# Patient Record
Sex: FEMALE | Race: WHITE | NOT HISPANIC OR LATINO | Employment: STUDENT | URBAN - METROPOLITAN AREA
[De-identification: names, ages, dates, MRNs, and addresses within clinical notes are randomized per-mention and may not be internally consistent; named-entity substitution may affect disease eponyms.]

---

## 2023-09-06 ENCOUNTER — LAB (OUTPATIENT)
Dept: LAB | Facility: LAB | Age: 23
End: 2023-09-06
Payer: COMMERCIAL

## 2023-09-25 PROBLEM — H69.93 EUSTACHIAN TUBE DYSFUNCTION, BILATERAL: Status: ACTIVE | Noted: 2023-09-25

## 2023-09-25 PROBLEM — F41.1 GENERALIZED ANXIETY DISORDER WITH PANIC ATTACKS: Chronic | Status: ACTIVE | Noted: 2018-10-26

## 2023-09-25 PROBLEM — N94.0 MITTELSCHMERZ: Status: ACTIVE | Noted: 2023-09-25

## 2023-09-25 PROBLEM — F41.0 GENERALIZED ANXIETY DISORDER WITH PANIC ATTACKS: Chronic | Status: ACTIVE | Noted: 2018-10-26

## 2023-09-25 RX ORDER — ESCITALOPRAM OXALATE 10 MG/1
10 TABLET ORAL DAILY
COMMUNITY
Start: 2019-02-26 | End: 2023-10-03

## 2023-09-25 RX ORDER — ADAPALENE AND BENZOYL PEROXIDE GEL, 0.1%/2.5% 1; 25 MG/G; MG/G
GEL TOPICAL
COMMUNITY
Start: 2017-01-16 | End: 2023-10-03

## 2023-09-25 RX ORDER — ALBUTEROL SULFATE 90 UG/1
2 AEROSOL, METERED RESPIRATORY (INHALATION) EVERY 4 HOURS PRN
COMMUNITY
Start: 2017-01-16 | End: 2023-10-03

## 2023-09-25 RX ORDER — FLUTICASONE PROPIONATE 50 MCG
2 SPRAY, SUSPENSION (ML) NASAL DAILY
COMMUNITY
Start: 2021-08-11 | End: 2023-10-03

## 2023-09-25 RX ORDER — DESOGESTREL AND ETHINYL ESTRADIOL 21-5 (28)
KIT ORAL DAILY
COMMUNITY
Start: 2019-05-23 | End: 2023-10-03

## 2023-09-25 RX ORDER — IBUPROFEN 200 MG
400 TABLET ORAL EVERY 6 HOURS PRN
COMMUNITY
Start: 2013-03-20 | End: 2023-12-14 | Stop reason: WASHOUT

## 2023-09-25 RX ORDER — ONDANSETRON 4 MG/1
4 TABLET, ORALLY DISINTEGRATING ORAL EVERY 6 HOURS PRN
COMMUNITY
Start: 2021-08-10 | End: 2023-10-03

## 2023-09-25 RX ORDER — ESCITALOPRAM OXALATE 20 MG/1
20 TABLET ORAL DAILY
COMMUNITY
End: 2023-10-03

## 2023-10-02 ENCOUNTER — APPOINTMENT (OUTPATIENT)
Dept: NEUROLOGY | Facility: CLINIC | Age: 23
End: 2023-10-02
Payer: COMMERCIAL

## 2023-10-03 ENCOUNTER — OFFICE VISIT (OUTPATIENT)
Dept: OBSTETRICS AND GYNECOLOGY | Facility: CLINIC | Age: 23
End: 2023-10-03
Payer: COMMERCIAL

## 2023-10-03 VITALS
WEIGHT: 146.8 LBS | SYSTOLIC BLOOD PRESSURE: 106 MMHG | HEIGHT: 69 IN | DIASTOLIC BLOOD PRESSURE: 64 MMHG | BODY MASS INDEX: 21.74 KG/M2

## 2023-10-03 DIAGNOSIS — Z30.09 COUNSELING FOR BIRTH CONTROL, ORAL CONTRACEPTIVES: ICD-10-CM

## 2023-10-03 DIAGNOSIS — Z01.419 ENCOUNTER FOR GYNECOLOGICAL EXAMINATION WITHOUT ABNORMAL FINDING: Primary | ICD-10-CM

## 2023-10-03 PROCEDURE — 1036F TOBACCO NON-USER: CPT

## 2023-10-03 PROCEDURE — 99395 PREV VISIT EST AGE 18-39: CPT

## 2023-10-03 RX ORDER — NORETHINDRONE ACETATE AND ETHINYL ESTRADIOL 1MG-20(21)
1 KIT ORAL DAILY
Qty: 84 TABLET | Refills: 3 | Status: SHIPPED | OUTPATIENT
Start: 2023-10-03 | End: 2023-12-13

## 2023-10-03 ASSESSMENT — ENCOUNTER SYMPTOMS
FEVER: 0
HEADACHES: 0
COLOR CHANGE: 0
LIGHT-HEADEDNESS: 0
UNEXPECTED WEIGHT CHANGE: 0
VOMITING: 0
DYSURIA: 0
DIZZINESS: 0
SHORTNESS OF BREATH: 0
ABDOMINAL PAIN: 0
NAUSEA: 0
FATIGUE: 0

## 2023-10-03 NOTE — PROGRESS NOTES
"Subjective   Kenny Franklin is a 23 y.o. female who is here for a routine GYN exam. Menses are overall fairly regular, sometimes can be a week or so late. History of PCOS. No intermenstrual bleeding, spotting, or discharge. Patient getting  this weekend, has previously been on OCPs and had some associated nausea and bloating with it, but would like to start back on for contraception. No CI to OCPs. Same long term partner, declines STI testing today.    Current contraception: none  History of abnormal Pap smear: no  Family history of uterine or ovarian cancer: yes - maternal grandmother  Regular self breast exam: yes  History of abnormal mammogram: no  Family history of breast cancer: no  Menstrual History:  OB History          0    Para   0    Term   0       0    AB   0    Living   0         SAB   0    IAB   0    Ectopic   0    Multiple   0    Live Births   0                Menarche age: 12  Patient's last menstrual period was 2023.         Review of Systems   Constitutional:  Negative for fatigue, fever and unexpected weight change.   Respiratory:  Negative for shortness of breath.    Gastrointestinal:  Negative for abdominal pain, nausea and vomiting.   Genitourinary:  Negative for dysuria, menstrual problem, pelvic pain and vaginal discharge.   Skin:  Negative for color change and rash.   Neurological:  Negative for dizziness, light-headedness and headaches.       Objective   /64   Ht 1.753 m (5' 9\")   Wt 66.6 kg (146 lb 12.8 oz)   LMP 2023   BMI 21.68 kg/m²     Physical Exam   General:   alert and oriented, in no acute distress, appears stated age, and cooperative   Breasts: No masses, no tenderness, no nipple abnormality or discharge, no skin discoloration or texture changes, no axillary lymphadenopathy   Lungs: No respiratory distress   Abdomen: soft, non-tender, without masses or organomegaly   Vulva: normal   Vagina: normal mucosa   Cervix: Normal, non-tender   Uterus: " non-tender, normal shape and consistency   Adnexa: normal adnexa     Assessment/Plan   -Discussed starting back on OCP and r/b; will trial 20 mcg pill to help prevent nausea/bloating and improve menstrual cramps. Discussed how to start pill pack and memory cues. Patient to return in 3 months for follow up med check visit.  -UTD on pap smear, due with next annual or after 8/2024.  -Declines STI screening today.

## 2023-10-19 ENCOUNTER — OFFICE VISIT (OUTPATIENT)
Dept: NEUROLOGY | Facility: CLINIC | Age: 23
End: 2023-10-19
Payer: COMMERCIAL

## 2023-10-19 VITALS
HEART RATE: 95 BPM | TEMPERATURE: 97.8 F | WEIGHT: 151 LBS | DIASTOLIC BLOOD PRESSURE: 89 MMHG | SYSTOLIC BLOOD PRESSURE: 139 MMHG | BODY MASS INDEX: 22.36 KG/M2 | RESPIRATION RATE: 18 BRPM | HEIGHT: 69 IN

## 2023-10-19 DIAGNOSIS — R42 VERTIGO: Primary | ICD-10-CM

## 2023-10-19 DIAGNOSIS — G44.219 EPISODIC TENSION-TYPE HEADACHE, NOT INTRACTABLE: ICD-10-CM

## 2023-10-19 PROCEDURE — 1036F TOBACCO NON-USER: CPT | Performed by: STUDENT IN AN ORGANIZED HEALTH CARE EDUCATION/TRAINING PROGRAM

## 2023-10-19 PROCEDURE — 99204 OFFICE O/P NEW MOD 45 MIN: CPT | Performed by: STUDENT IN AN ORGANIZED HEALTH CARE EDUCATION/TRAINING PROGRAM

## 2023-10-19 ASSESSMENT — ENCOUNTER SYMPTOMS
OCCASIONAL FEELINGS OF UNSTEADINESS: 0
LOSS OF SENSATION IN FEET: 0
DEPRESSION: 0

## 2023-10-19 NOTE — PROGRESS NOTES
"Subjective     Kenny Franklin is a 23 y.o. year old female who presents for evaluation of headaches and dizziness.     Her headaches started in May. She initially saw a Neurologist at [facility]. She was diagnosed with tension headaches. She had an MRI brain without contrast 8/1/2023.     She has episodes of lightheadedness and dizziness. When she is lying down, she will suddenly feel like this \"rush\" like she is falling and it makes her startle. She will feel woozy during these episodes as if she is \"car sick.\" This lasts for about an hour. The symptoms are rapid in onset and will gradually resolve. She will also experience room spinning dizziness when she lies down flat. She has ear fullness in both ears but no hearing loss.     She has occasional throbbing pain \"like her head is pressured and going to pop.\" The headaches are sharp and sudden in onset. The sharp pains last a few minutes. She also has pain periauricular and bilateral frontal. She has occasional nausea but this is associated with her dizziness. The pain does not wake her up at night but if she wakes up to go to the bathroom she feels dizzy. The dull pains can last a few hours.     She has tried advil for headaches but her headaches are not usually severe enough or long enough to take medications.     She had no associated URI prior to the onset of these symptoms. She recalls bilateral ear pain over the summer in July, went to an urgent care. She had an otologic exam and was told her \"ears were red and bulging.\" She was treated with antibiotics with some improvement while on the antibiotics but then her symptoms returned.     She has lifelong motion sickness, cannot read in the car, has to sit in the front seat.     Every times she swallows she can hear a cracking sensation around her ears.     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 However, her ears are still bothering her. She previously saw an ENT and " was told she had eustachian tube dysfunction. She tried Flonase but felt that her symptoms were worse.   She has no history of headaches as a teenager and college student.  She hs a family history of intense headaches in her mother. Her mother and older brother have motion sickness as well.    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 She was recently .     Patient Active Problem List   Diagnosis    Eustachian tube dysfunction, bilateral    Generalized anxiety disorder with panic attacks    Idiopathic scoliosis and kyphoscoliosis    Mittelschmerz    Ovarian cyst    Ovarian torsion    Encounter for gynecological examination without abnormal finding    Counseling for birth control, oral contraceptives    Vertigo     Past Medical History:   Diagnosis Date    PCOS (polycystic ovarian syndrome)      Past Surgical History:   Procedure Laterality Date    OVARY SURGERY Right     RT ovary torsion     Social History     Tobacco Use    Smoking status: Never    Smokeless tobacco: Never   Substance Use Topics    Alcohol use: Yes     Comment: 3 every other week     family history includes Hyperlipidemia in her father; Lung cancer in her paternal grandfather; No Known Problems in her brother and brother; Ovarian cancer in her maternal grandmother; Prostate cancer in her paternal grandfather; Stomach cancer in her maternal grandfather; Uterine cancer in her maternal grandmother.    Current Outpatient Medications:     ibuprofen 200 mg tablet, Take 2 tablets (400 mg) by mouth every 6 hours if needed for fever (temp greater than 38.0 C), moderate pain (4 - 6) or mild pain (1 - 3)., Disp: , Rfl:     norethindrone-e.estradioL-iron (Junel FE 1/20) 1 mg-20 mcg (21)/75 mg (7) tablet, Take 1 tablet by mouth once daily. (Patient taking differently: Take 1 tablet by mouth once daily. Not started yet), Disp: 84 tablet, Rfl: 3  No Known Allergies    Objective   Neurological Exam  Mental Status  Awake, alert and oriented to person, place  and time. Speech is normal.    Cranial Nerves  CN II: Visual fields full to confrontation.  CN III, IV, VI: Extraocular movements intact bilaterally.  CN V: Facial sensation is normal.  CN VII: Full and symmetric facial movement.  CN VIII: Hearing is normal.  CN IX, X: Palate elevates symmetrically  CN XI: Shoulder shrug strength is normal.  CN XII: Tongue midline without atrophy or fasciculations.    Motor   Strength is 5/5 throughout all four extremities.    Sensory  Light touch is normal in upper and lower extremities.     Coordination  Right: Finger-to-nose normal.Left: Finger-to-nose normal.    Assessment/Plan   Diagnoses and all orders for this visit:  Vertigo  Episodic tension-type headache, not intractable    Headaches: probable migraine headache trait with episodic tension headaches. Discussed lifestyle modifications and trigger factor avoidance  Vertigo: horizontal nystagmus on R gaze on exam, persistent dizziness with leaning head backwards or lying down at night that has persisted for several months. No hearing loss or ear fullness. Will check MRI IAC with and without contrast, referral to vestibular PT    Follow-up in 6 months

## 2023-11-02 ENCOUNTER — ANCILLARY PROCEDURE (OUTPATIENT)
Dept: RADIOLOGY | Facility: CLINIC | Age: 23
End: 2023-11-02
Payer: COMMERCIAL

## 2023-11-02 DIAGNOSIS — G44.219 EPISODIC TENSION-TYPE HEADACHE, NOT INTRACTABLE: ICD-10-CM

## 2023-11-02 DIAGNOSIS — R42 VERTIGO: ICD-10-CM

## 2023-11-02 PROCEDURE — A9575 INJ GADOTERATE MEGLUMI 0.1ML: HCPCS | Performed by: STUDENT IN AN ORGANIZED HEALTH CARE EDUCATION/TRAINING PROGRAM

## 2023-11-02 PROCEDURE — 2550000001 HC RX 255 CONTRASTS: Performed by: STUDENT IN AN ORGANIZED HEALTH CARE EDUCATION/TRAINING PROGRAM

## 2023-11-02 PROCEDURE — 70553 MRI BRAIN STEM W/O & W/DYE: CPT

## 2023-11-02 PROCEDURE — 70553 MRI BRAIN STEM W/O & W/DYE: CPT | Performed by: RADIOLOGY

## 2023-11-02 RX ORDER — GADOTERATE MEGLUMINE 376.9 MG/ML
14 INJECTION INTRAVENOUS
Status: COMPLETED | OUTPATIENT
Start: 2023-11-02 | End: 2023-11-02

## 2023-11-02 RX ADMIN — GADOTERATE MEGLUMINE 14 ML: 376.9 INJECTION INTRAVENOUS at 11:33

## 2023-11-21 ENCOUNTER — LAB REQUISITION (OUTPATIENT)
Dept: LAB | Facility: HOSPITAL | Age: 23
End: 2023-11-21
Payer: COMMERCIAL

## 2023-11-21 LAB — SARS-COV-2 RNA RESP QL NAA+PROBE: DETECTED

## 2023-11-21 PROCEDURE — 87635 SARS-COV-2 COVID-19 AMP PRB: CPT

## 2023-12-08 ENCOUNTER — HOSPITAL ENCOUNTER (OUTPATIENT)
Dept: CARDIOLOGY | Facility: HOSPITAL | Age: 23
Discharge: HOME | End: 2023-12-08
Payer: COMMERCIAL

## 2023-12-08 ENCOUNTER — OFFICE VISIT (OUTPATIENT)
Dept: CARDIOLOGY | Facility: CLINIC | Age: 23
End: 2023-12-08
Payer: COMMERCIAL

## 2023-12-08 ENCOUNTER — APPOINTMENT (OUTPATIENT)
Dept: CARDIOLOGY | Facility: CLINIC | Age: 23
End: 2023-12-08
Payer: COMMERCIAL

## 2023-12-08 VITALS
HEIGHT: 69 IN | BODY MASS INDEX: 22.66 KG/M2 | DIASTOLIC BLOOD PRESSURE: 77 MMHG | HEART RATE: 95 BPM | SYSTOLIC BLOOD PRESSURE: 123 MMHG | WEIGHT: 153 LBS | OXYGEN SATURATION: 97 %

## 2023-12-08 DIAGNOSIS — R07.82 INTERCOSTAL PAIN: ICD-10-CM

## 2023-12-08 DIAGNOSIS — R07.82 INTERCOSTAL PAIN: Primary | ICD-10-CM

## 2023-12-08 DIAGNOSIS — R42 VERTIGO: ICD-10-CM

## 2023-12-08 DIAGNOSIS — R00.2 PALPITATIONS: ICD-10-CM

## 2023-12-08 PROCEDURE — 93010 ELECTROCARDIOGRAM REPORT: CPT | Performed by: INTERNAL MEDICINE

## 2023-12-08 PROCEDURE — 99213 OFFICE O/P EST LOW 20 MIN: CPT | Performed by: NURSE PRACTITIONER

## 2023-12-08 PROCEDURE — 1036F TOBACCO NON-USER: CPT | Performed by: NURSE PRACTITIONER

## 2023-12-08 PROCEDURE — 93005 ELECTROCARDIOGRAM TRACING: CPT

## 2023-12-08 NOTE — PROGRESS NOTES
Kenny Franklin is a 23 y.o. female     History Of Present Illness   Miss Franklin is a 23 year old with a PMH of depression, anxiety, ovarian torsion, PCOS, migraines and vertigo, here for complaints of chest pain. She reports significant palpitations with position changes that cause her to have chest pains.  The chest tightness is nonradiating and nothing makes the pain worse and nothing makes it better. She describes the pain as burning and achey.  Her apple watch indicates her HR is 80.   The pain lasts several seconds and is more predominant in the evening and with position changes.  She was seen by cardiology in the past and underwent a completed cardiac work up with no underlying cause found.       Social HX  Social History     Tobacco Use    Smoking status: Never    Smokeless tobacco: Never   Vaping Use    Vaping Use: Never used   Substance Use Topics    Alcohol use: Yes     Comment: 3 every other week    Drug use: Never          Family HX  Family History   Problem Relation Name Age of Onset    Hyperlipidemia Father      No Known Problems Brother      No Known Problems Brother      Ovarian cancer Maternal Grandmother      Uterine cancer Maternal Grandmother      Stomach cancer Maternal Grandfather      Lung cancer Paternal Grandfather      Prostate cancer Paternal Grandfather        Anxiety disorder Mother  Hyperlipidemia Father  Anxiety disorder Brother  Ovarian cancer Maternal Grandmother  other (stomach cancer) Maternal Grandfather  Lung Cancer Paternal Grandfather      Review Of Systems   Review of Systems   Constitutional: Negative.  Negative for activity change, appetite change, chills and diaphoresis.   HENT: Negative.     Eyes: Negative.    Respiratory:  Positive for chest tightness. Negative for shortness of breath.    Cardiovascular:  Positive for chest pain and palpitations.   Endocrine: Negative.    Genitourinary: Negative.    Skin: Negative.    Allergic/Immunologic: Negative.    Neurological:  Negative  for dizziness, tremors, syncope, speech difficulty, weakness, light-headedness and numbness.   Hematological: Negative.    Psychiatric/Behavioral:  Positive for dysphoric mood. Negative for agitation. The patient is nervous/anxious.           Allergies  No Known Allergies       Vitals  BP is 123/77    Physical Exam  Physical Exam  Constitutional:       Appearance: Normal appearance. She is normal weight.   HENT:      Head: Normocephalic.      Nose: Nose normal.      Mouth/Throat:      Mouth: Mucous membranes are moist.      Pharynx: Oropharynx is clear.   Cardiovascular:      Rate and Rhythm: Normal rate and regular rhythm.      Pulses: Normal pulses.      Heart sounds: Normal heart sounds. No murmur heard.     No friction rub. No gallop.   Pulmonary:      Effort: Pulmonary effort is normal. No respiratory distress.      Breath sounds: Normal breath sounds. No wheezing.   Abdominal:      General: Abdomen is flat.      Palpations: Abdomen is soft.   Musculoskeletal:         General: No tenderness or signs of injury. Normal range of motion.      Cervical back: Normal range of motion and neck supple.      Right lower leg: Edema present.      Left lower leg: No edema.   Skin:     General: Skin is warm and dry.   Neurological:      General: No focal deficit present.      Mental Status: She is alert and oriented to person, place, and time. Mental status is at baseline.   Psychiatric:         Mood and Affect: Mood normal.         Behavior: Behavior normal.         Thought Content: Thought content normal.         Judgment: Judgment normal.            Current/Home Meds    Current Outpatient Medications:     ibuprofen 200 mg tablet, Take 2 tablets (400 mg) by mouth every 6 hours if needed for fever (temp greater than 38.0 C), moderate pain (4 - 6) or mild pain (1 - 3)., Disp: , Rfl:     norethindrone-e.estradioL-iron (Junel FE 1/20) 1 mg-20 mcg (21)/75 mg (7) tablet, Take 1 tablet by mouth once daily. (Patient taking  differently: Take 1 tablet by mouth once daily. Not started yet), Disp: 84 tablet, Rfl: 3       Labs           EKG Findings  EKG: TODAY shows normal sinus rhythm with an abnormal QRS-T angle conisder T wave abnormality         Cardiac Service Results:  4/6/23: ECHO: 1. Left ventricular systolic function is normal with a 60-65% estimated ejection fraction.       Assessment/Plan      CHEST PAIN:  EKG today shows NSR with a t wave abnormaility  BP is 123/77  Prior echo shows a normal LV systolic function with 60-65%  Orthostatic vital signs show:    LAYING 120/80  77  SITTING 120/80  92  STANDING 122/80  104    Orthostatic vital signs indicate POTS.  Will have her under go autonomic testing and follow up after testing to review the results.  She understands a normal heart rate in between .  She is to increase her fluid intake and wear compression stockings from am to HS.  Joy may also increase her sodium intake until her follow up.

## 2023-12-11 ENCOUNTER — TELEPHONE (OUTPATIENT)
Dept: OBSTETRICS AND GYNECOLOGY | Facility: CLINIC | Age: 23
End: 2023-12-11
Payer: COMMERCIAL

## 2023-12-11 ASSESSMENT — ENCOUNTER SYMPTOMS
MYALGIAS: 0
APPETITE CHANGE: 0
HEMATOLOGIC/LYMPHATIC NEGATIVE: 1
VOMITING: 0
DIZZINESS: 0
CONSTITUTIONAL NEGATIVE: 1
DIARRHEA: 0
SHORTNESS OF BREATH: 0
ABDOMINAL PAIN: 1
NERVOUS/ANXIOUS: 1
FLATUS: 0
SPEECH DIFFICULTY: 0
CONSTIPATION: 0
HEADACHES: 0
PALPITATIONS: 1
BELCHING: 0
ACTIVITY CHANGE: 0
DYSPHORIC MOOD: 1
FREQUENCY: 0
WEAKNESS: 0
EYES NEGATIVE: 1
ENDOCRINE NEGATIVE: 1
HEMATURIA: 0
NUMBNESS: 0
ARTHRALGIAS: 0
AGITATION: 0
DIAPHORESIS: 0
CHILLS: 0
FEVER: 0
CHEST TIGHTNESS: 1
WEIGHT LOSS: 0
ALLERGIC/IMMUNOLOGIC NEGATIVE: 1
ANOREXIA: 0
LIGHT-HEADEDNESS: 0
TREMORS: 0
NAUSEA: 0
HEMATOCHEZIA: 0
DYSURIA: 0

## 2023-12-13 ENCOUNTER — LAB (OUTPATIENT)
Dept: LAB | Facility: LAB | Age: 23
End: 2023-12-13
Payer: COMMERCIAL

## 2023-12-13 ENCOUNTER — OFFICE VISIT (OUTPATIENT)
Dept: OBSTETRICS AND GYNECOLOGY | Facility: CLINIC | Age: 23
End: 2023-12-13
Payer: COMMERCIAL

## 2023-12-13 VITALS
SYSTOLIC BLOOD PRESSURE: 118 MMHG | DIASTOLIC BLOOD PRESSURE: 80 MMHG | WEIGHT: 151 LBS | BODY MASS INDEX: 22.36 KG/M2 | HEIGHT: 69 IN

## 2023-12-13 DIAGNOSIS — R07.82 INTERCOSTAL PAIN: ICD-10-CM

## 2023-12-13 DIAGNOSIS — N94.10 DYSPAREUNIA IN FEMALE: ICD-10-CM

## 2023-12-13 DIAGNOSIS — R42 VERTIGO: ICD-10-CM

## 2023-12-13 DIAGNOSIS — N76.1 SUBACUTE VAGINITIS: ICD-10-CM

## 2023-12-13 DIAGNOSIS — N92.6 IRREGULAR MENSES: Primary | ICD-10-CM

## 2023-12-13 DIAGNOSIS — N92.6 IRREGULAR MENSES: ICD-10-CM

## 2023-12-13 DIAGNOSIS — L70.9 ACNE, UNSPECIFIED ACNE TYPE: ICD-10-CM

## 2023-12-13 DIAGNOSIS — R10.2 PELVIC PAIN: ICD-10-CM

## 2023-12-13 DIAGNOSIS — R00.2 PALPITATIONS: ICD-10-CM

## 2023-12-13 LAB
ALBUMIN SERPL-MCNC: 4.9 G/DL (ref 3.5–5)
ALP BLD-CCNC: 40 U/L (ref 35–125)
ALT SERPL-CCNC: 13 U/L (ref 5–40)
ANION GAP SERPL CALC-SCNC: 17 MMOL/L
AST SERPL-CCNC: 15 U/L (ref 5–40)
BILIRUB SERPL-MCNC: 0.7 MG/DL (ref 0.1–1.2)
BUN SERPL-MCNC: 12 MG/DL (ref 8–25)
CALCIUM SERPL-MCNC: 9.9 MG/DL (ref 8.5–10.4)
CHLORIDE SERPL-SCNC: 105 MMOL/L (ref 97–107)
CHOLEST SERPL-MCNC: 129 MG/DL (ref 133–200)
CHOLEST/HDLC SERPL: 2.3 {RATIO}
CO2 SERPL-SCNC: 20 MMOL/L (ref 24–31)
CREAT SERPL-MCNC: 0.7 MG/DL (ref 0.4–1.6)
ERYTHROCYTE [DISTWIDTH] IN BLOOD BY AUTOMATED COUNT: 12.4 % (ref 11.5–14.5)
GFR SERPL CREATININE-BSD FRML MDRD: >90 ML/MIN/1.73M*2
GLUCOSE SERPL-MCNC: 84 MG/DL (ref 65–99)
HCG SERPL-ACNC: <1 MIU/ML
HCT VFR BLD AUTO: 43.1 % (ref 36–46)
HDLC SERPL-MCNC: 55 MG/DL
HGB BLD-MCNC: 14.2 G/DL (ref 12–16)
LDLC SERPL CALC-MCNC: 62 MG/DL (ref 65–130)
MCH RBC QN AUTO: 30.3 PG (ref 26–34)
MCHC RBC AUTO-ENTMCNC: 32.9 G/DL (ref 32–36)
MCV RBC AUTO: 92 FL (ref 80–100)
NRBC BLD-RTO: 0 /100 WBCS (ref 0–0)
PLATELET # BLD AUTO: 297 X10*3/UL (ref 150–450)
POTASSIUM SERPL-SCNC: 4 MMOL/L (ref 3.4–5.1)
PROT SERPL-MCNC: 7.1 G/DL (ref 5.9–7.9)
RBC # BLD AUTO: 4.69 X10*6/UL (ref 4–5.2)
SODIUM SERPL-SCNC: 142 MMOL/L (ref 133–145)
TRIGL SERPL-MCNC: 61 MG/DL (ref 40–150)
TSH SERPL DL<=0.05 MIU/L-ACNC: 1.58 MIU/L (ref 0.27–4.2)
WBC # BLD AUTO: 5.1 X10*3/UL (ref 4.4–11.3)

## 2023-12-13 PROCEDURE — 84402 ASSAY OF FREE TESTOSTERONE: CPT

## 2023-12-13 PROCEDURE — 84702 CHORIONIC GONADOTROPIN TEST: CPT

## 2023-12-13 PROCEDURE — 99213 OFFICE O/P EST LOW 20 MIN: CPT | Performed by: OBSTETRICS & GYNECOLOGY

## 2023-12-13 PROCEDURE — 85027 COMPLETE CBC AUTOMATED: CPT

## 2023-12-13 PROCEDURE — 82627 DEHYDROEPIANDROSTERONE: CPT

## 2023-12-13 PROCEDURE — 87205 SMEAR GRAM STAIN: CPT | Performed by: OBSTETRICS & GYNECOLOGY

## 2023-12-13 PROCEDURE — 80061 LIPID PANEL: CPT

## 2023-12-13 PROCEDURE — 1036F TOBACCO NON-USER: CPT | Performed by: OBSTETRICS & GYNECOLOGY

## 2023-12-13 PROCEDURE — 36415 COLL VENOUS BLD VENIPUNCTURE: CPT

## 2023-12-13 PROCEDURE — 80053 COMPREHEN METABOLIC PANEL: CPT

## 2023-12-13 PROCEDURE — 84146 ASSAY OF PROLACTIN: CPT

## 2023-12-13 PROCEDURE — 84443 ASSAY THYROID STIM HORMONE: CPT

## 2023-12-13 PROCEDURE — 83498 ASY HYDROXYPROGESTERONE 17-D: CPT

## 2023-12-13 ASSESSMENT — LIFESTYLE VARIABLES
HOW OFTEN DO YOU HAVE SIX OR MORE DRINKS ON ONE OCCASION: NEVER
SKIP TO QUESTIONS 9-10: 1
HOW MANY STANDARD DRINKS CONTAINING ALCOHOL DO YOU HAVE ON A TYPICAL DAY: 1 OR 2
HOW OFTEN DO YOU HAVE A DRINK CONTAINING ALCOHOL: MONTHLY OR LESS
AUDIT-C TOTAL SCORE: 1

## 2023-12-13 ASSESSMENT — PATIENT HEALTH QUESTIONNAIRE - PHQ9
1. LITTLE INTEREST OR PLEASURE IN DOING THINGS: NOT AT ALL
2. FEELING DOWN, DEPRESSED OR HOPELESS: NOT AT ALL
SUM OF ALL RESPONSES TO PHQ9 QUESTIONS 1 & 2: 0

## 2023-12-13 ASSESSMENT — PAIN SCALES - GENERAL: PAINLEVEL: 0-NO PAIN

## 2023-12-13 NOTE — PROGRESS NOTES
Subjective   Kenny Franklin is a 23 y.o. female who complains of vaginal discharge/odor    HPI:  Symptoms reported: foul vaginal odor, burning, and pelvic pain; pain w/sex  Onset:  2months  Course: intermittent  Progression: stayed the same    Helpful treatments: none  Unhelpful treatments tried: none    Pt also notes menses [id number], worsening acne, prev us showing multiple follicles, wants eval for pcos as well    Objective   Physical Exam:   General Appearance: alert and oriented, in no acute distress  Abdomen: soft, non-tender; bowel sounds normal; no masses, no organomegaly  Pelvic Exam: external genitalia normal, vagina normal without discharge, uterus normal size, shape, and consistency, no cervical motion tenderness, cervix normal in appearance, no adnexal masses or tenderness, and rectovaginal septum normal; +tight pelvic floor, lots of tension noted w/exam  Urine dipstick: not done.    Assessment/Plan   Diagnoses and all orders for this visit:  Irregular menses  -     CBC; Future  -     Testosterone,Free and Total; Future  -     DHEA-Sulfate; Future  -     17-Hydroxyprogesterone; Future  -     hCG, quantitative; Future  -     TSH with reflex to Free T4 if abnormal; Future  -     Prolactin; Future  -     US PELVIS TRANSABDOMINAL WITH TRANSVAGINAL; Future  Acne, unspecified acne type  -     Testosterone,Free and Total; Future  -     DHEA-Sulfate; Future  -     17-Hydroxyprogesterone; Future  Subacute vaginitis  -     Vaginitis Gram Stain For Bacterial Vaginosis + Yeast  Dyspareunia in female  -     US PELVIS TRANSABDOMINAL WITH TRANSVAGINAL; Future  -     Referral to Physical Therapy; Future  Pelvic pain  -     US PELVIS TRANSABDOMINAL WITH TRANSVAGINAL; Future  -     Referral to Physical Therapy; Future      Answers submitted by the patient for this visit:  Abdominal Pain Questionnaire (Submitted on 12/11/2023)  Chief Complaint: Abdominal pain  Chronicity: recurrent  Onset: more than 1 month ago  Onset  quality: gradual  Frequency: 2 to 4 times per day  Progression since onset: unchanged  Pain location: LLQ, RLQ  Pain - numeric: 3/10  Pain quality: cramping  Radiates to: back, pelvis  anorexia: No  arthralgias: No  belching: No  constipation: No  diarrhea: No  dysuria: No  fever: No  flatus: No  frequency: No  headaches: No  hematochezia: No  hematuria: No  melena: No  myalgias: No  nausea: No  weight loss: No  vomiting: No  Aggravated by: nothing  Relieved by: nothing

## 2023-12-14 ENCOUNTER — OFFICE VISIT (OUTPATIENT)
Dept: CARDIOLOGY | Facility: CLINIC | Age: 23
End: 2023-12-14
Payer: COMMERCIAL

## 2023-12-14 VITALS
HEART RATE: 94 BPM | DIASTOLIC BLOOD PRESSURE: 83 MMHG | HEIGHT: 69 IN | SYSTOLIC BLOOD PRESSURE: 144 MMHG | BODY MASS INDEX: 22.36 KG/M2 | WEIGHT: 151 LBS

## 2023-12-14 DIAGNOSIS — R00.2 PALPITATIONS: Primary | ICD-10-CM

## 2023-12-14 LAB
CLUE CELLS VAG LPF-#/AREA: ABNORMAL /[LPF]
DHEA-S SERPL-MCNC: 301 UG/DL (ref 65–395)
NUGENT SCORE: 2
PROLACTIN SERPL-MCNC: 15.1 UG/L (ref 3–20)
YEAST VAG WET PREP-#/AREA: PRESENT

## 2023-12-14 PROCEDURE — 99213 OFFICE O/P EST LOW 20 MIN: CPT | Performed by: INTERNAL MEDICINE

## 2023-12-14 PROCEDURE — 93000 ELECTROCARDIOGRAM COMPLETE: CPT | Performed by: INTERNAL MEDICINE

## 2023-12-14 PROCEDURE — 1036F TOBACCO NON-USER: CPT | Performed by: INTERNAL MEDICINE

## 2023-12-14 NOTE — PROGRESS NOTES
"  [facility]    Patient Name: Kenny Franklin  Patient : [id number]    Referred  for   Chief Complaint   Patient presents with    New Patient Visit        Kenny Franklin is a 23 y.o. year old female patient with    Depression/anxiety  Palpitations:  She reports significant palpitations with position changes that cause her to have chest pains. Additionally she feels lightheaded with position changes or when she is standing for prolonged periods of time. No syncope. Symptoms worsened after a recent COVID infection. She saw Lian Delong CNP. Referred patient for autonomic testing. She had a cardiac workup later this year as well including a normal echo.      Past Medical History:  She has a past medical history of PCOS (polycystic ovarian syndrome).    Past Surgical History:  She has a past surgical history that includes Ovary surgery (Right).      Social History:  She reports that she has never smoked. She has never used smokeless tobacco. She reports current alcohol use. She reports that she does not use drugs.    Family History:  Family History   Problem Relation Name Age of Onset    Hyperlipidemia Father High cholesterol     No Known Problems Brother      No Known Problems Brother      Ovarian cancer Maternal Grandmother Ovarian/uterine cancer     Uterine cancer Maternal Grandmother Ovarian/uterine cancer     Cancer Maternal Grandmother Ovarian/uterine cancer     Stomach cancer Maternal Grandfather      Lung cancer Paternal Grandfather      Prostate cancer Paternal Grandfather          Allergies:  Patient has no known allergies.    Outpatient Medications:  Current Outpatient Medications   Medication Instructions    ibuprofen 400 mg, oral, Every 6 hours PRN        ROS:  A 14 point review of systems was done and is negative other than as stated in HPI    Vitals:  Vitals:    23 1343   BP: 144/83   Pulse: 94   Weight: 68.5 kg (151 lb)   Height: 1.753 m (5' 9\")       Physical " Exam:   Physical Exam  Cardiovascular:      Rate and Rhythm: Normal rate and regular rhythm.   Pulmonary:      Effort: Pulmonary effort is normal.   Musculoskeletal:         General: No swelling.   Skin:     Findings: No rash.   Neurological:      General: No focal deficit present.      Mental Status: She is alert and oriented to person, place, and time.   Psychiatric:         Mood and Affect: Mood normal.          Intake/Output:   No intake/output data recorded.    Outpatient Medications  Current Outpatient Medications on File Prior to Visit   Medication Sig Dispense Refill    ibuprofen 200 mg tablet Take 2 tablets (400 mg) by mouth every 6 hours if needed for fever (temp greater than 38.0 C), moderate pain (4 - 6) or mild pain (1 - 3).      [DISCONTINUED] norethindrone-e.estradioL-iron (Junel FE 1/20) 1 mg-20 mcg (21)/75 mg (7) tablet Take 1 tablet by mouth once daily. (Patient not taking: Reported on 12/8/2023) 84 tablet 3     No current facility-administered medications on file prior to visit.       Labs: (past 26 weeks)  Recent Results (from the past 4368 hour(s))   Sars-CoV-2 PCR, Symptomatic    Collection Time: 11/21/23  2:10 PM   Result Value Ref Range    Coronavirus 2019, PCR Detected (A) Not Detected   Lipid panel    Collection Time: 12/13/23 11:43 AM   Result Value Ref Range    Cholesterol 129 (L) 133 - 200 mg/dL    HDL-Cholesterol 55.0 >50.0 mg/dL    Cholesterol/HDL Ratio 2.3 SEE COMMENT    LDL Calculated 62 (L) 65 - 130 mg/dL    Triglycerides 61 40 - 150 mg/dL   Comprehensive metabolic panel    Collection Time: 12/13/23 11:43 AM   Result Value Ref Range    Glucose 84 65 - 99 mg/dL    Sodium 142 133 - 145 mmol/L    Potassium 4.0 3.4 - 5.1 mmol/L    Chloride 105 97 - 107 mmol/L    Bicarbonate 20 (L) 24 - 31 mmol/L    Urea Nitrogen 12 8 - 25 mg/dL    Creatinine 0.70 0.40 - 1.60 mg/dL    eGFR >90 >60 mL/min/1.73m*2    Calcium 9.9 8.5 - 10.4 mg/dL    Albumin 4.9 3.5 - 5.0 g/dL    Alkaline Phosphatase 40 35 -  125 U/L    Total Protein 7.1 5.9 - 7.9 g/dL    AST 15 5 - 40 U/L    Bilirubin, Total 0.7 0.1 - 1.2 mg/dL    ALT 13 5 - 40 U/L    Anion Gap 17 <=19 mmol/L   CBC    Collection Time: 12/13/23 11:43 AM   Result Value Ref Range    WBC 5.1 4.4 - 11.3 x10*3/uL    nRBC 0.0 0.0 - 0.0 /100 WBCs    RBC 4.69 4.00 - 5.20 x10*6/uL    Hemoglobin 14.2 12.0 - 16.0 g/dL    Hematocrit 43.1 36.0 - 46.0 %    MCV 92 80 - 100 fL    MCH 30.3 26.0 - 34.0 pg    MCHC 32.9 32.0 - 36.0 g/dL    RDW 12.4 11.5 - 14.5 %    Platelets 297 150 - 450 x10*3/uL   DHEA-Sulfate    Collection Time: 12/13/23 11:43 AM   Result Value Ref Range    DHEA Sulfate 301 65 - 395 ug/dL   hCG, quantitative    Collection Time: 12/13/23 11:43 AM   Result Value Ref Range    HCG, Beta-Quantitative <1 SEE COMMENT BELOW mIU/mL   TSH with reflex to Free T4 if abnormal    Collection Time: 12/13/23 11:43 AM   Result Value Ref Range    Thyroid Stimulating Hormone 1.58 0.27 - 4.20 mIU/L   Prolactin    Collection Time: 12/13/23 11:43 AM   Result Value Ref Range    Prolactin 15.1 3.0 - 20.0 ug/L       ECG  No results found for this or any previous visit (from the past 4464 hour(s)).    Echocardiogram  No results found for this or any previous visit from the past 1095 days.      Assessment/Plan:     Patient comes in today worried about her ECG being read as abnormal. ECG today with NSR, normal QRS morphology with non specific ST/T wave changes. ECG appears to be normal. She appears to have a structurally normal heart with normal echo earlier this year. Her symptoms do appear to be secondary to autonomic dysfunction. Reassurance provided. Advised her to increase her sodium and water intake. She will continue to follow with general cardiology.

## 2023-12-15 DIAGNOSIS — B37.31 YEAST VAGINITIS: Primary | ICD-10-CM

## 2023-12-15 RX ORDER — FLUCONAZOLE 150 MG/1
150 TABLET ORAL SEE ADMIN INSTRUCTIONS
Qty: 2 TABLET | Refills: 0 | Status: SHIPPED | OUTPATIENT
Start: 2023-12-15 | End: 2023-12-16

## 2023-12-17 LAB
TESTOSTERONE FREE (CHAN): 2.1 PG/ML (ref 0.1–6.4)
TESTOSTERONE,TOTAL,LC-MS/MS: 26 NG/DL (ref 2–45)

## 2023-12-20 LAB — 17OHP SERPL-MCNC: 62.24 NG/DL

## 2023-12-21 ENCOUNTER — OFFICE VISIT (OUTPATIENT)
Dept: PRIMARY CARE | Facility: CLINIC | Age: 23
End: 2023-12-21
Payer: COMMERCIAL

## 2023-12-21 ENCOUNTER — HOSPITAL ENCOUNTER (OUTPATIENT)
Dept: RADIOLOGY | Facility: HOSPITAL | Age: 23
Discharge: HOME | End: 2023-12-21
Payer: COMMERCIAL

## 2023-12-21 ENCOUNTER — APPOINTMENT (OUTPATIENT)
Dept: RADIOLOGY | Facility: CLINIC | Age: 23
End: 2023-12-21
Payer: COMMERCIAL

## 2023-12-21 ENCOUNTER — APPOINTMENT (OUTPATIENT)
Dept: CARDIOLOGY | Facility: CLINIC | Age: 23
End: 2023-12-21
Payer: COMMERCIAL

## 2023-12-21 ENCOUNTER — LAB (OUTPATIENT)
Dept: LAB | Facility: LAB | Age: 23
End: 2023-12-21
Payer: COMMERCIAL

## 2023-12-21 VITALS
OXYGEN SATURATION: 98 % | HEIGHT: 69 IN | SYSTOLIC BLOOD PRESSURE: 120 MMHG | BODY MASS INDEX: 22.36 KG/M2 | TEMPERATURE: 97.1 F | HEART RATE: 68 BPM | DIASTOLIC BLOOD PRESSURE: 70 MMHG | WEIGHT: 151 LBS

## 2023-12-21 DIAGNOSIS — N92.6 IRREGULAR MENSES: ICD-10-CM

## 2023-12-21 DIAGNOSIS — R53.83 OTHER FATIGUE: Primary | ICD-10-CM

## 2023-12-21 DIAGNOSIS — N94.10 DYSPAREUNIA IN FEMALE: ICD-10-CM

## 2023-12-21 DIAGNOSIS — R00.0 TACHYCARDIA: ICD-10-CM

## 2023-12-21 DIAGNOSIS — R10.2 PELVIC PAIN: ICD-10-CM

## 2023-12-21 DIAGNOSIS — R53.83 OTHER FATIGUE: ICD-10-CM

## 2023-12-21 PROCEDURE — 36415 COLL VENOUS BLD VENIPUNCTURE: CPT

## 2023-12-21 PROCEDURE — 82306 VITAMIN D 25 HYDROXY: CPT

## 2023-12-21 PROCEDURE — 83735 ASSAY OF MAGNESIUM: CPT

## 2023-12-21 PROCEDURE — 83550 IRON BINDING TEST: CPT

## 2023-12-21 PROCEDURE — 83540 ASSAY OF IRON: CPT

## 2023-12-21 PROCEDURE — 1036F TOBACCO NON-USER: CPT | Performed by: NURSE PRACTITIONER

## 2023-12-21 PROCEDURE — 99214 OFFICE O/P EST MOD 30 MIN: CPT | Performed by: NURSE PRACTITIONER

## 2023-12-21 PROCEDURE — 76856 US EXAM PELVIC COMPLETE: CPT

## 2023-12-21 PROCEDURE — 82607 VITAMIN B-12: CPT

## 2023-12-21 PROCEDURE — 85652 RBC SED RATE AUTOMATED: CPT

## 2023-12-21 PROCEDURE — 82728 ASSAY OF FERRITIN: CPT

## 2023-12-21 ASSESSMENT — ENCOUNTER SYMPTOMS
RESPIRATORY NEGATIVE: 1
SLEEP DISTURBANCE: 1
LIGHT-HEADEDNESS: 1
NERVOUS/ANXIOUS: 1
FATIGUE: 1
GASTROINTESTINAL NEGATIVE: 1
PALPITATIONS: 0
MYALGIAS: 1
DIZZINESS: 1

## 2023-12-21 NOTE — PROGRESS NOTES
"Subjective   Patient ID: Kenny Franklin is a 23 y.o. female who presents for New Patient Visit (Would like iron panel and all the generic vitamins checked. ).    HPI   Patient here to establish with provider. Previous provider [facility].   Current concerns\"  1) patient interested in labs to check iron and vitamin levels, always feels tired, not a lot of energy.   Chronic concerns: Palpitations, depression/anxiety, PCOS,  Ovarian torsion, vertigo  Specialist  - Psychiatrist appt tomorrow.  - GYN Dr Chu  - Neurology Dr De Jesus  -Endocrinology Dr Tirado  - cardiology Dr Villafana   Labs 12/13/2023 GYN   NON SMOKER  Diet- could be better, but not horrible, occasional caffeine but limits  Walks for exercise but not 150  minutes.   Menses- LMP 12/16/2023- 5 days, heavy 2 days. 28- 31 day cycle.    Review of Systems   Constitutional:  Positive for fatigue.   Respiratory: Negative.     Cardiovascular:  Negative for palpitations.        Intermittent tachycardia and feeling of syncope   Gastrointestinal: Negative.    Genitourinary: Negative.    Musculoskeletal:  Positive for myalgias (pulsating feeling in legs).   Neurological:  Positive for dizziness and light-headedness. Negative for syncope (feels like she is going to pass out on regular basis).   Psychiatric/Behavioral:  Positive for sleep disturbance. The patient is nervous/anxious.      Objective   /70   Pulse 68   Temp 36.2 °C (97.1 °F)   Ht 1.753 m (5' 9\")   Wt 68.5 kg (151 lb)   LMP 11/17/2023 (Exact Date)   SpO2 98%   BMI 22.30 kg/m²     Physical Exam  Vitals reviewed.   Constitutional:       Appearance: She is normal weight.   HENT:      Right Ear: Tympanic membrane and ear canal normal.      Left Ear: Tympanic membrane and ear canal normal.   Neck:      Thyroid: No thyromegaly.   Cardiovascular:      Rate and Rhythm: Normal rate and regular rhythm.      Heart sounds: Normal heart sounds.   Pulmonary:      Effort: Pulmonary effort is normal.      Breath " sounds: Normal breath sounds.   Musculoskeletal:      Cervical back: Normal range of motion.   Lymphadenopathy:      Cervical:      Right cervical: No superficial cervical adenopathy.  Skin:     General: Skin is warm.   Neurological:      General: No focal deficit present.      Mental Status: She is alert.   Psychiatric:         Mood and Affect: Mood normal.         Behavior: Behavior normal.         Judgment: Judgment normal.       Assessment/Plan   Diagnoses and all orders for this visit:  Reviewed labs, testing completed by specialist, discussed result with patient.   Other fatigue  Discussed increasing fluid intake  -     Vitamin B12; Future  -     Iron and TIBC; Future  -     Vitamin D 25-Hydroxy,Total (for eval of Vitamin D levels); Future  -     Magnesium; Future  -     Ferritin; Future  -     Sedimentation Rate; Future  Tachycardia- patient established with cardiology, testing negative per patient. POTS suspected, tilt-table testing scheduled per patient.     PLAN:  follow up as needed, recommended a yearly wellness  Labs-> communicate results through MY CHART.

## 2023-12-22 LAB
25(OH)D3 SERPL-MCNC: 16 NG/ML (ref 30–100)
ERYTHROCYTE [SEDIMENTATION RATE] IN BLOOD BY WESTERGREN METHOD: 9 MM/H (ref 0–20)
FERRITIN SERPL-MCNC: 20 NG/ML (ref 8–150)
IRON SATN MFR SERPL: 30 % (ref 25–45)
IRON SERPL-MCNC: 138 UG/DL (ref 35–150)
MAGNESIUM SERPL-MCNC: 2.05 MG/DL (ref 1.6–2.4)
TIBC SERPL-MCNC: 457 UG/DL (ref 240–445)
UIBC SERPL-MCNC: 319 UG/DL (ref 110–370)
VIT B12 SERPL-MCNC: 437 PG/ML (ref 211–911)

## 2023-12-27 DIAGNOSIS — E55.9 VITAMIN D DEFICIENCY: ICD-10-CM

## 2023-12-27 DIAGNOSIS — R79.89 HIGH TOTAL IRON BINDING CAPACITY: ICD-10-CM

## 2023-12-27 DIAGNOSIS — R53.83 FATIGUE, UNSPECIFIED TYPE: Primary | ICD-10-CM

## 2023-12-31 LAB
ATRIAL RATE: 96 BPM
P AXIS: 75 DEGREES
P OFFSET: 207 MS
P ONSET: 148 MS
PR INTERVAL: 148 MS
Q ONSET: 222 MS
QRS COUNT: 15 BEATS
QRS DURATION: 96 MS
QT INTERVAL: 356 MS
QTC CALCULATION(BAZETT): 449 MS
QTC FREDERICIA: 416 MS
R AXIS: 80 DEGREES
T AXIS: -33 DEGREES
T OFFSET: 400 MS
VENTRICULAR RATE: 96 BPM

## 2024-01-05 ENCOUNTER — APPOINTMENT (OUTPATIENT)
Dept: OBSTETRICS AND GYNECOLOGY | Facility: CLINIC | Age: 24
End: 2024-01-05
Payer: COMMERCIAL

## 2024-01-11 PROBLEM — N94.0 MITTELSCHMERZ: Status: RESOLVED | Noted: 2023-09-25 | Resolved: 2024-01-11

## 2024-01-12 ENCOUNTER — APPOINTMENT (OUTPATIENT)
Dept: CARDIOLOGY | Facility: CLINIC | Age: 24
End: 2024-01-12
Payer: COMMERCIAL

## 2024-01-15 ENCOUNTER — TELEMEDICINE (OUTPATIENT)
Dept: OBSTETRICS AND GYNECOLOGY | Facility: CLINIC | Age: 24
End: 2024-01-15
Payer: COMMERCIAL

## 2024-01-15 DIAGNOSIS — N94.6 DYSMENORRHEA: Primary | ICD-10-CM

## 2024-01-15 DIAGNOSIS — E28.2 PCOS (POLYCYSTIC OVARIAN SYNDROME): ICD-10-CM

## 2024-01-15 DIAGNOSIS — N94.10 DYSPAREUNIA IN FEMALE: ICD-10-CM

## 2024-01-15 PROCEDURE — 99213 OFFICE O/P EST LOW 20 MIN: CPT | Mod: 95 | Performed by: OBSTETRICS & GYNECOLOGY

## 2024-01-15 PROCEDURE — 99213 OFFICE O/P EST LOW 20 MIN: CPT | Performed by: OBSTETRICS & GYNECOLOGY

## 2024-01-15 RX ORDER — IBUPROFEN 600 MG/1
600 TABLET ORAL EVERY 6 HOURS PRN
Qty: 30 TABLET | Refills: 2 | Status: SHIPPED | OUTPATIENT
Start: 2024-01-15 | End: 2024-02-26 | Stop reason: WASHOUT

## 2024-01-22 NOTE — PROGRESS NOTES
Subjective   Patient ID: Kenny Franklin is a 23 y.o. female who presents for No chief complaint on file..  Pt is a 24yo hererfor followup for lab work. Labs done and all wnl. US done and showing PCOS ovaries. Last couple of periods have been painful but more regular. Pain during sex has improved s/p treatment for yeast.        Review of Systems   Genitourinary:  Negative for pelvic pain, vaginal bleeding and vaginal discharge.       Objective   Physical Exam  Constitutional:       Appearance: Normal appearance.   Neurological:      Mental Status: She is alert.         Assessment/Plan   Problem List Items Addressed This Visit    None  Visit Diagnoses         Codes    Dysmenorrhea    -  Primary N94.6    Relevant Medications    ibuprofen 600 mg tablet    Dyspareunia in female     N94.10    PCOS (polycystic ovarian syndrome)     E28.2        Improved painful intercourse; diet/lifestyle changes dw pt for pcos; notify if/when wants to attempt pregnancy if not successful         Amaya Chu MD 01/22/24 9:24 AM   
.

## 2024-02-01 ENCOUNTER — APPOINTMENT (OUTPATIENT)
Dept: CARDIOLOGY | Facility: CLINIC | Age: 24
End: 2024-02-01
Payer: COMMERCIAL

## 2024-02-21 ENCOUNTER — LAB (OUTPATIENT)
Dept: LAB | Facility: LAB | Age: 24
End: 2024-02-21
Payer: COMMERCIAL

## 2024-02-21 ENCOUNTER — APPOINTMENT (OUTPATIENT)
Dept: PRIMARY CARE | Facility: CLINIC | Age: 24
End: 2024-02-21
Payer: COMMERCIAL

## 2024-02-21 DIAGNOSIS — R11.0 NAUSEATED: ICD-10-CM

## 2024-02-21 DIAGNOSIS — R42 LIGHT HEADED: ICD-10-CM

## 2024-02-21 DIAGNOSIS — R42 LIGHT HEADED: Primary | ICD-10-CM

## 2024-02-21 LAB
ALBUMIN SERPL BCP-MCNC: 4.9 G/DL (ref 3.4–5)
ALP SERPL-CCNC: 31 U/L (ref 33–110)
ALT SERPL W P-5'-P-CCNC: 13 U/L (ref 7–45)
ANION GAP SERPL CALC-SCNC: 12 MMOL/L (ref 10–20)
AST SERPL W P-5'-P-CCNC: 14 U/L (ref 9–39)
BASOPHILS # BLD AUTO: 0.06 X10*3/UL (ref 0–0.1)
BASOPHILS NFR BLD AUTO: 1.1 %
BILIRUB SERPL-MCNC: 0.7 MG/DL (ref 0–1.2)
BUN SERPL-MCNC: 11 MG/DL (ref 6–23)
CALCIUM SERPL-MCNC: 9.5 MG/DL (ref 8.6–10.3)
CHLORIDE SERPL-SCNC: 103 MMOL/L (ref 98–107)
CO2 SERPL-SCNC: 25 MMOL/L (ref 21–32)
CREAT SERPL-MCNC: 0.64 MG/DL (ref 0.5–1.05)
EGFRCR SERPLBLD CKD-EPI 2021: >90 ML/MIN/1.73M*2
EOSINOPHIL # BLD AUTO: 0.03 X10*3/UL (ref 0–0.7)
EOSINOPHIL NFR BLD AUTO: 0.5 %
ERYTHROCYTE [DISTWIDTH] IN BLOOD BY AUTOMATED COUNT: 12.4 % (ref 11.5–14.5)
GLUCOSE SERPL-MCNC: 89 MG/DL (ref 74–99)
HCT VFR BLD AUTO: 42 % (ref 36–46)
HGB BLD-MCNC: 13.6 G/DL (ref 12–16)
IMM GRANULOCYTES # BLD AUTO: 0.01 X10*3/UL (ref 0–0.7)
IMM GRANULOCYTES NFR BLD AUTO: 0.2 % (ref 0–0.9)
LYMPHOCYTES # BLD AUTO: 1.77 X10*3/UL (ref 1.2–4.8)
LYMPHOCYTES NFR BLD AUTO: 32.2 %
MCH RBC QN AUTO: 30 PG (ref 26–34)
MCHC RBC AUTO-ENTMCNC: 32.4 G/DL (ref 32–36)
MCV RBC AUTO: 93 FL (ref 80–100)
MONOCYTES # BLD AUTO: 0.41 X10*3/UL (ref 0.1–1)
MONOCYTES NFR BLD AUTO: 7.5 %
NEUTROPHILS # BLD AUTO: 3.21 X10*3/UL (ref 1.2–7.7)
NEUTROPHILS NFR BLD AUTO: 58.5 %
NRBC BLD-RTO: 0 /100 WBCS (ref 0–0)
PLATELET # BLD AUTO: 345 X10*3/UL (ref 150–450)
POTASSIUM SERPL-SCNC: 3.8 MMOL/L (ref 3.5–5.3)
PROT SERPL-MCNC: 7.1 G/DL (ref 6.4–8.2)
RBC # BLD AUTO: 4.53 X10*6/UL (ref 4–5.2)
SODIUM SERPL-SCNC: 136 MMOL/L (ref 136–145)
WBC # BLD AUTO: 5.5 X10*3/UL (ref 4.4–11.3)

## 2024-02-21 PROCEDURE — 80053 COMPREHEN METABOLIC PANEL: CPT

## 2024-02-21 PROCEDURE — 85025 COMPLETE CBC W/AUTO DIFF WBC: CPT

## 2024-02-21 PROCEDURE — 36415 COLL VENOUS BLD VENIPUNCTURE: CPT

## 2024-02-24 PROBLEM — F41.9 ANXIETY: Status: ACTIVE | Noted: 2018-10-26

## 2024-02-24 PROBLEM — N76.1 SUBACUTE VAGINITIS: Status: ACTIVE | Noted: 2024-02-24

## 2024-02-24 PROBLEM — R53.83 FATIGUE: Status: ACTIVE | Noted: 2023-12-21

## 2024-02-24 PROBLEM — E28.2 POLYCYSTIC OVARY SYNDROME: Status: ACTIVE | Noted: 2024-02-24

## 2024-02-24 PROBLEM — J34.89 NASAL DISCHARGE: Status: ACTIVE | Noted: 2024-02-24

## 2024-02-24 PROBLEM — R07.89 ATYPICAL CHEST PAIN: Status: ACTIVE | Noted: 2023-12-13

## 2024-02-24 PROBLEM — N94.10 DYSPAREUNIA IN FEMALE: Status: ACTIVE | Noted: 2024-02-24

## 2024-02-24 PROBLEM — R09.81 NASAL CONGESTION: Status: ACTIVE | Noted: 2024-02-24

## 2024-02-24 PROBLEM — N92.6 IRREGULAR MENSTRUAL CYCLE: Status: ACTIVE | Noted: 2023-12-13

## 2024-02-24 PROBLEM — L70.9 ACNE: Status: ACTIVE | Noted: 2023-12-13

## 2024-02-24 PROBLEM — R00.0 TACHYCARDIA: Status: ACTIVE | Noted: 2023-12-13

## 2024-02-24 PROBLEM — R10.2 PAIN IN PELVIS: Status: ACTIVE | Noted: 2022-12-14

## 2024-02-26 ENCOUNTER — OFFICE VISIT (OUTPATIENT)
Dept: CARDIOLOGY | Facility: CLINIC | Age: 24
End: 2024-02-26
Payer: COMMERCIAL

## 2024-02-26 VITALS
HEART RATE: 86 BPM | SYSTOLIC BLOOD PRESSURE: 140 MMHG | WEIGHT: 152 LBS | OXYGEN SATURATION: 97 % | HEIGHT: 69 IN | DIASTOLIC BLOOD PRESSURE: 80 MMHG | BODY MASS INDEX: 22.51 KG/M2

## 2024-02-26 DIAGNOSIS — R00.0 TACHYCARDIA: Primary | ICD-10-CM

## 2024-02-26 PROCEDURE — 93010 ELECTROCARDIOGRAM REPORT: CPT | Performed by: INTERNAL MEDICINE

## 2024-02-26 PROCEDURE — 93005 ELECTROCARDIOGRAM TRACING: CPT | Performed by: NURSE PRACTITIONER

## 2024-02-26 PROCEDURE — 99213 OFFICE O/P EST LOW 20 MIN: CPT | Performed by: NURSE PRACTITIONER

## 2024-02-26 PROCEDURE — 1036F TOBACCO NON-USER: CPT | Performed by: NURSE PRACTITIONER

## 2024-02-26 RX ORDER — ESCITALOPRAM OXALATE 5 MG/1
5 TABLET ORAL
COMMUNITY
Start: 2024-02-08

## 2024-02-26 NOTE — PROGRESS NOTES
Kenny Franklin is a 23 y.o. female     History Of Present Illness   Miss Franklin is a 23 year old with a PMH of depression, anxiety, ovarian torsion, PCOS, migraines and vertigo, here for concern that her lexapro is causing her to have longs QT.  She notes her HR is in the 80s.  She did not get her autonomic testing completed as directed, however she notes the lexapro is improving her anxiety.      Social HX  Social History     Tobacco Use    Smoking status: Never    Smokeless tobacco: Never   Vaping Use    Vaping Use: Never used   Substance Use Topics    Alcohol use: Yes     Comment: 3 every other week    Drug use: Never          Family HX  Family History   Problem Relation Name Age of Onset    Hyperlipidemia Father High cholesterol     No Known Problems Brother      No Known Problems Brother      Ovarian cancer Maternal Grandmother Ovarian/uterine cancer     Uterine cancer Maternal Grandmother Ovarian/uterine cancer     Cancer Maternal Grandmother Ovarian/uterine cancer     Stomach cancer Maternal Grandfather      Lung cancer Paternal Grandfather      Prostate cancer Paternal Grandfather            Review Of Systems   Constitutional: not feeling tired.   Eyes: no eyesight problems.  No vision loss or change in vision  ENT: no hearing loss and no nosebleeds.   Cardiovascular: No intermittent leg claudication,   No chest pain, no tightness or heavy pressure  No shortness of breath,  No palpitations,  No lower extremity edema  The heart rate is regular  Respiratory: no chronic cough and no shortness of breath.   Gastrointestinal: no change in bowel habits and no blood in stools.   Genitourinary: no urinary frequency.   Skin: no skin rashes.   Neurological: No frequent falls.   No dizziness  No weakness  Denies headaches  Psychiatric: no depression and not suicidal.   All other systems have been reviewed and are negative for complaint.        Allergies  No Known Allergies       Vitals  There were no vitals taken for this  visit.        Physical Exam  Constitutional: alert and in no acute distress.   Eyes: no erythema, swelling or discharge from the eye .   Neck: neck is supple, symmetric, trachea midline, no masses  and no thyromegaly .   Pulmonary: No increased work of breathing or signs of respiratory distress    Lungs clear to auscultation.    Auscultation of the lungs revealed no expiratory wheezing, normal expiratory time and no inspiratory wheezing. no rales or crackles were heard bilaterally. no rhonchi  No friction rub. no wheezing.   No diminished breath sounds. no bronchial breath sounds.   Cardiovascular: carotid pulses 2+ bilaterally with no bruit    JVP was normal, no thrills ,   Regular rhythm, normal S1 and S2, no murmurs  the heart rate was normal normal S1, normal S2, no S3, no S4 no murmurs were heard.,   Pedal pulses 2+ bilaterally    No edema .   Abdomen: abdomen non-tender, no masses  and no hepatomegaly . No pulsatile mass noted  Skin: skin warm and dry, normal skin turgor .   Psychiatric judgment and insight is normal  and oriented to person, place and time .           Current/Home Meds    Current Outpatient Medications:     ibuprofen 600 mg tablet, Take 1 tablet (600 mg) by mouth every 6 hours if needed for mild pain (1 - 3)., Disp: 30 tablet, Rfl: 2       Labs           EKG Findings  EKG: normal sinus rhythm,  Nonspecific t wave abnormality  Qt/Qtc 368/445        Cardiac Service Results:  No results found for this or any previous visit from the past 365 days.        Assessment/Plan    Concern for longs QT:  EKG: normal sinus rhythm,  Nonspecific t wave abnormality  Qt/Qtc 368/445    BP is slightly elevated at 140/80.  She may continue her lexapro as directed.  Will have her follow up after her autonomic testing.

## 2024-02-29 LAB
ATRIAL RATE: 88 BPM
P AXIS: 73 DEGREES
P OFFSET: 198 MS
P ONSET: 151 MS
PR INTERVAL: 140 MS
Q ONSET: 221 MS
QRS COUNT: 14 BEATS
QRS DURATION: 96 MS
QT INTERVAL: 368 MS
QTC CALCULATION(BAZETT): 445 MS
QTC FREDERICIA: 418 MS
R AXIS: 63 DEGREES
T AXIS: 35 DEGREES
T OFFSET: 405 MS
VENTRICULAR RATE: 88 BPM

## 2024-03-22 ENCOUNTER — PHARMACY VISIT (OUTPATIENT)
Dept: PHARMACY | Facility: CLINIC | Age: 24
End: 2024-03-22
Payer: COMMERCIAL

## 2024-03-22 PROCEDURE — RXMED WILLOW AMBULATORY MEDICATION CHARGE

## 2024-03-22 RX ORDER — AZITHROMYCIN 250 MG/1
TABLET, FILM COATED ORAL
Qty: 6 TABLET | Refills: 0 | OUTPATIENT
Start: 2024-03-22 | End: 2024-05-23 | Stop reason: WASHOUT

## 2024-03-26 ENCOUNTER — APPOINTMENT (OUTPATIENT)
Dept: PRIMARY CARE | Facility: CLINIC | Age: 24
End: 2024-03-26
Payer: COMMERCIAL

## 2024-03-28 ENCOUNTER — LAB (OUTPATIENT)
Dept: LAB | Facility: LAB | Age: 24
End: 2024-03-28
Payer: COMMERCIAL

## 2024-03-28 DIAGNOSIS — R25.2 MUSCLE CRAMPING: Primary | ICD-10-CM

## 2024-03-28 DIAGNOSIS — R25.2 MUSCLE CRAMPING: ICD-10-CM

## 2024-03-28 LAB
ALBUMIN SERPL BCP-MCNC: 4.5 G/DL (ref 3.4–5)
ALP SERPL-CCNC: 34 U/L (ref 33–110)
ALT SERPL W P-5'-P-CCNC: 18 U/L (ref 7–45)
ANION GAP SERPL CALC-SCNC: 14 MMOL/L (ref 10–20)
AST SERPL W P-5'-P-CCNC: 15 U/L (ref 9–39)
BILIRUB SERPL-MCNC: 0.6 MG/DL (ref 0–1.2)
BUN SERPL-MCNC: 13 MG/DL (ref 6–23)
CALCIUM SERPL-MCNC: 9.8 MG/DL (ref 8.6–10.6)
CHLORIDE SERPL-SCNC: 106 MMOL/L (ref 98–107)
CO2 SERPL-SCNC: 28 MMOL/L (ref 21–32)
CREAT SERPL-MCNC: 0.6 MG/DL (ref 0.5–1.05)
EGFRCR SERPLBLD CKD-EPI 2021: >90 ML/MIN/1.73M*2
GLUCOSE SERPL-MCNC: 94 MG/DL (ref 74–99)
MAGNESIUM SERPL-MCNC: 1.81 MG/DL (ref 1.6–2.4)
POTASSIUM SERPL-SCNC: 3.9 MMOL/L (ref 3.5–5.3)
PROT SERPL-MCNC: 7.3 G/DL (ref 6.4–8.2)
SODIUM SERPL-SCNC: 144 MMOL/L (ref 136–145)

## 2024-03-28 PROCEDURE — 36415 COLL VENOUS BLD VENIPUNCTURE: CPT

## 2024-03-28 PROCEDURE — 80053 COMPREHEN METABOLIC PANEL: CPT

## 2024-03-28 PROCEDURE — 83735 ASSAY OF MAGNESIUM: CPT

## 2024-04-12 ENCOUNTER — APPOINTMENT (OUTPATIENT)
Dept: NEUROLOGY | Facility: CLINIC | Age: 24
End: 2024-04-12
Payer: COMMERCIAL

## 2024-04-17 ENCOUNTER — APPOINTMENT (OUTPATIENT)
Dept: GASTROENTEROLOGY | Facility: CLINIC | Age: 24
End: 2024-04-17
Payer: COMMERCIAL

## 2024-05-22 RX ORDER — CITALOPRAM 10 MG/1
TABLET ORAL
COMMUNITY
Start: 2024-01-29 | End: 2024-05-23 | Stop reason: WASHOUT

## 2024-05-22 RX ORDER — FLUOXETINE 10 MG/1
CAPSULE ORAL
COMMUNITY
Start: 2024-01-11 | End: 2024-05-23 | Stop reason: WASHOUT

## 2024-05-22 RX ORDER — HYDROXYZINE HYDROCHLORIDE 25 MG/1
TABLET, FILM COATED ORAL
COMMUNITY
Start: 2024-03-22

## 2024-05-22 RX ORDER — NORETHINDRONE ACETATE AND ETHINYL ESTRADIOL 1MG-20(21)
1 KIT ORAL
COMMUNITY
Start: 2023-10-03 | End: 2024-05-23 | Stop reason: WASHOUT

## 2024-05-22 RX ORDER — VILAZODONE HYDROCHLORIDE 10 MG/1
TABLET ORAL
COMMUNITY
Start: 2023-07-02 | End: 2024-05-23 | Stop reason: WASHOUT

## 2024-05-23 ENCOUNTER — OFFICE VISIT (OUTPATIENT)
Dept: GASTROENTEROLOGY | Facility: CLINIC | Age: 24
End: 2024-05-23
Payer: COMMERCIAL

## 2024-05-23 ENCOUNTER — LAB (OUTPATIENT)
Dept: LAB | Facility: LAB | Age: 24
End: 2024-05-23
Payer: COMMERCIAL

## 2024-05-23 ENCOUNTER — HOSPITAL ENCOUNTER (OUTPATIENT)
Dept: RADIOLOGY | Facility: CLINIC | Age: 24
Discharge: HOME | End: 2024-05-23
Payer: COMMERCIAL

## 2024-05-23 VITALS
TEMPERATURE: 98.1 F | SYSTOLIC BLOOD PRESSURE: 122 MMHG | HEIGHT: 69 IN | HEART RATE: 94 BPM | WEIGHT: 153 LBS | DIASTOLIC BLOOD PRESSURE: 73 MMHG | BODY MASS INDEX: 22.66 KG/M2

## 2024-05-23 DIAGNOSIS — K58.1 IRRITABLE BOWEL SYNDROME WITH CONSTIPATION: ICD-10-CM

## 2024-05-23 DIAGNOSIS — K59.00 CONSTIPATION, UNSPECIFIED CONSTIPATION TYPE: ICD-10-CM

## 2024-05-23 DIAGNOSIS — K92.1 BLOOD IN STOOL: ICD-10-CM

## 2024-05-23 DIAGNOSIS — K59.00 CONSTIPATION, UNSPECIFIED CONSTIPATION TYPE: Primary | ICD-10-CM

## 2024-05-23 LAB — HCG UR QL IA.RAPID: NEGATIVE

## 2024-05-23 PROCEDURE — 99214 OFFICE O/P EST MOD 30 MIN: CPT | Performed by: NURSE PRACTITIONER

## 2024-05-23 PROCEDURE — 74018 RADEX ABDOMEN 1 VIEW: CPT | Performed by: RADIOLOGY

## 2024-05-23 PROCEDURE — 81025 URINE PREGNANCY TEST: CPT

## 2024-05-23 PROCEDURE — 74018 RADEX ABDOMEN 1 VIEW: CPT

## 2024-05-23 PROCEDURE — 99204 OFFICE O/P NEW MOD 45 MIN: CPT | Performed by: NURSE PRACTITIONER

## 2024-05-23 RX ORDER — DICYCLOMINE HYDROCHLORIDE 10 MG/1
10 CAPSULE ORAL
Qty: 120 CAPSULE | Refills: 11 | Status: SHIPPED | OUTPATIENT
Start: 2024-05-23 | End: 2025-05-23

## 2024-05-23 ASSESSMENT — ENCOUNTER SYMPTOMS
BLOOD IN STOOL: 1
EYES NEGATIVE: 1
RESPIRATORY NEGATIVE: 1
ABDOMINAL PAIN: 1
ENDOCRINE NEGATIVE: 1
ALLERGIC/IMMUNOLOGIC NEGATIVE: 1
MUSCULOSKELETAL NEGATIVE: 1
NEUROLOGICAL NEGATIVE: 1
PSYCHIATRIC NEGATIVE: 1
CARDIOVASCULAR NEGATIVE: 1
HEMATOLOGIC/LYMPHATIC NEGATIVE: 1
CONSTITUTIONAL NEGATIVE: 1

## 2024-05-23 ASSESSMENT — PAIN SCALES - GENERAL: PAINLEVEL: 0-NO PAIN

## 2024-05-23 NOTE — PROGRESS NOTES
Subjective   Patient ID: Kenny Franklin is a 24 y.o. female who presents for New Patient Visit (New patient).  HPI  24-year-old female for new patient visit for evaluation of abdominal pain and blood in stool  Medical history includes anxiety, depression, ovarian torsion, PCOS, migraines and vertigo  Labs reviewed 3/28/2024  Normal CMP and CBC  Has never had digestive issues until March  Noticed red streak on her stool  Bright red  Stool was normal  Next day had blood mixed into the stool  Did eat pickled beets the day before  Has not had it since then  Has had flecks since then but nothing as alarming  Has pains on her left side  Intermittent  Ripping gas pain to dull and ache  Bloated  Constipation- hard pebbly stool  Took miralax and helped  Bm daily  FHX- maternal grandfather- stomach cancer age 60's  Fiber; fruit, vegetables  Water; 48 oz per day  Exercise- walks 4 times per week  Work- health care specialist at [employer]    Review of Systems   Constitutional: Negative.    HENT: Negative.     Eyes: Negative.    Respiratory: Negative.     Cardiovascular: Negative.    Gastrointestinal:  Positive for abdominal pain and blood in stool.   Endocrine: Negative.    Genitourinary: Negative.    Musculoskeletal: Negative.    Skin: Negative.    Allergic/Immunologic: Negative.    Neurological: Negative.    Hematological: Negative.    Psychiatric/Behavioral: Negative.         Objective   Physical Exam  Constitutional:       Appearance: Normal appearance.   HENT:      Head: Normocephalic.      Nose: Nose normal.      Mouth/Throat:      Mouth: Mucous membranes are moist.   Eyes:      Pupils: Pupils are equal, round, and reactive to light.   Cardiovascular:      Rate and Rhythm: Normal rate and regular rhythm.      Pulses: Normal pulses.      Heart sounds: Normal heart sounds.   Pulmonary:      Effort: Pulmonary effort is normal.      Breath sounds: Normal breath sounds.   Abdominal:      General: Bowel sounds are normal.       Palpations: Abdomen is soft.   Musculoskeletal:         General: Normal range of motion.      Cervical back: Normal range of motion.   Skin:     General: Skin is warm and dry.   Neurological:      Mental Status: She is alert.   Psychiatric:         Mood and Affect: Mood normal.     Rectum - no external hemorrhoids    Assessment/Plan        Irritable bowel syndrome with constipation- the ? blood in your stool is most likely from hemorrhoids. I will order a stool study for blood to confirm blood or not. I would also recommend getting an abd x-ray to check for stool burden. You can continue the miralax or add plums into your diet as well to help with your stools as well as citrucel 2 capsules daily to help keep you more regular.     If the stool is positive for blood then I would recommend a colonoscopy.    I will contact you with the results and determine follow up        ANNIE Odonnell 05/23/24 10:38 AM

## 2024-05-23 NOTE — PATIENT INSTRUCTIONS
Irritable bowel syndrome with constipation- the ? blood in your stool is most likely from hemorrhoids. I will order a stool study for blood to confirm blood or not. I would also recommend getting an abd x-ray to check for stool burden. You can continue the miralax or add plums into your diet as well to help with your stools as well as citrucel 2 capsules daily to help keep you more regular.     If the stool is positive for blood then I would recommend a colonoscopy.    I will contact you with the results and determine follow up

## 2024-06-28 ENCOUNTER — LAB (OUTPATIENT)
Dept: LAB | Facility: LAB | Age: 24
End: 2024-06-28
Payer: COMMERCIAL

## 2024-06-28 DIAGNOSIS — R79.89 HIGH TOTAL IRON BINDING CAPACITY: ICD-10-CM

## 2024-06-28 DIAGNOSIS — R53.83 FATIGUE, UNSPECIFIED TYPE: ICD-10-CM

## 2024-06-28 DIAGNOSIS — E55.9 VITAMIN D DEFICIENCY: ICD-10-CM

## 2024-06-28 LAB
ERYTHROCYTE [DISTWIDTH] IN BLOOD BY AUTOMATED COUNT: 12.4 % (ref 11.5–14.5)
HCT VFR BLD AUTO: 42.2 % (ref 36–46)
HGB BLD-MCNC: 14.2 G/DL (ref 12–16)
MCH RBC QN AUTO: 30.2 PG (ref 26–34)
MCHC RBC AUTO-ENTMCNC: 33.6 G/DL (ref 32–36)
MCV RBC AUTO: 90 FL (ref 80–100)
NRBC BLD-RTO: 0 /100 WBCS (ref 0–0)
PLATELET # BLD AUTO: 303 X10*3/UL (ref 150–450)
RBC # BLD AUTO: 4.7 X10*6/UL (ref 4–5.2)
WBC # BLD AUTO: 5.3 X10*3/UL (ref 4.4–11.3)

## 2024-06-28 PROCEDURE — 36415 COLL VENOUS BLD VENIPUNCTURE: CPT

## 2024-06-28 PROCEDURE — 83540 ASSAY OF IRON: CPT

## 2024-06-28 PROCEDURE — 82306 VITAMIN D 25 HYDROXY: CPT

## 2024-06-28 PROCEDURE — 82607 VITAMIN B-12: CPT

## 2024-06-28 PROCEDURE — 85027 COMPLETE CBC AUTOMATED: CPT

## 2024-06-28 PROCEDURE — 82728 ASSAY OF FERRITIN: CPT

## 2024-06-28 PROCEDURE — 83550 IRON BINDING TEST: CPT

## 2024-06-29 LAB
25(OH)D3 SERPL-MCNC: 29 NG/ML (ref 30–100)
FERRITIN SERPL-MCNC: 22 NG/ML (ref 8–150)
IRON SATN MFR SERPL: 48 % (ref 25–45)
IRON SERPL-MCNC: 230 UG/DL (ref 35–150)
TIBC SERPL-MCNC: 475 UG/DL (ref 240–445)
UIBC SERPL-MCNC: 245 UG/DL (ref 110–370)
VIT B12 SERPL-MCNC: 368 PG/ML (ref 211–911)

## 2024-07-02 DIAGNOSIS — N92.6 IRREGULAR MENSTRUAL CYCLE: ICD-10-CM

## 2024-07-02 DIAGNOSIS — R53.83 OTHER FATIGUE: Primary | ICD-10-CM

## 2024-08-28 ENCOUNTER — APPOINTMENT (OUTPATIENT)
Dept: OBSTETRICS AND GYNECOLOGY | Facility: CLINIC | Age: 24
End: 2024-08-28
Payer: COMMERCIAL

## 2024-09-05 ENCOUNTER — APPOINTMENT (OUTPATIENT)
Dept: OBSTETRICS AND GYNECOLOGY | Facility: CLINIC | Age: 24
End: 2024-09-05
Payer: COMMERCIAL

## 2024-09-05 DIAGNOSIS — L65.9 HAIR LOSS: ICD-10-CM

## 2024-09-05 DIAGNOSIS — L70.9 ACNE, UNSPECIFIED ACNE TYPE: ICD-10-CM

## 2024-09-05 DIAGNOSIS — R14.0 MENSTRUAL BLOATING: ICD-10-CM

## 2024-09-05 DIAGNOSIS — R63.5 WEIGHT GAIN: ICD-10-CM

## 2024-09-05 DIAGNOSIS — N92.6 IRREGULAR MENSES: ICD-10-CM

## 2024-09-05 DIAGNOSIS — N94.89 MENSTRUAL BLOATING: ICD-10-CM

## 2024-09-05 DIAGNOSIS — E28.2 PCOS (POLYCYSTIC OVARIAN SYNDROME): Primary | ICD-10-CM

## 2024-09-06 ENCOUNTER — LAB (OUTPATIENT)
Dept: LAB | Facility: LAB | Age: 24
End: 2024-09-06
Payer: COMMERCIAL

## 2024-09-06 DIAGNOSIS — L70.9 ACNE, UNSPECIFIED ACNE TYPE: ICD-10-CM

## 2024-09-06 DIAGNOSIS — R63.5 WEIGHT GAIN: ICD-10-CM

## 2024-09-06 DIAGNOSIS — N92.6 IRREGULAR MENSES: ICD-10-CM

## 2024-09-06 DIAGNOSIS — L65.9 HAIR LOSS: ICD-10-CM

## 2024-09-06 DIAGNOSIS — E28.2 PCOS (POLYCYSTIC OVARIAN SYNDROME): ICD-10-CM

## 2024-09-06 LAB
B-HCG SERPL-ACNC: <2 MIU/ML
FOLATE SERPL-MCNC: 14.7 NG/ML
GLUCOSE P FAST SERPL-MCNC: 77 MG/DL (ref 74–99)
TSH SERPL-ACNC: 1.96 MIU/L (ref 0.44–3.98)

## 2024-09-06 PROCEDURE — 82627 DEHYDROEPIANDROSTERONE: CPT

## 2024-09-06 PROCEDURE — 84702 CHORIONIC GONADOTROPIN TEST: CPT

## 2024-09-06 PROCEDURE — 83498 ASY HYDROXYPROGESTERONE 17-D: CPT

## 2024-09-06 PROCEDURE — 84443 ASSAY THYROID STIM HORMONE: CPT

## 2024-09-06 PROCEDURE — 83525 ASSAY OF INSULIN: CPT

## 2024-09-06 PROCEDURE — 36415 COLL VENOUS BLD VENIPUNCTURE: CPT

## 2024-09-06 PROCEDURE — 82746 ASSAY OF FOLIC ACID SERUM: CPT

## 2024-09-06 PROCEDURE — 84402 ASSAY OF FREE TESTOSTERONE: CPT

## 2024-09-06 PROCEDURE — 84146 ASSAY OF PROLACTIN: CPT

## 2024-09-06 PROCEDURE — 82947 ASSAY GLUCOSE BLOOD QUANT: CPT

## 2024-09-07 LAB
DHEA-S SERPL-MCNC: 251 UG/DL (ref 65–395)
INSULIN P FAST SERPL-ACNC: 9 UIU/ML (ref 3–25)
PROLACTIN SERPL-MCNC: 8.5 UG/L (ref 3–20)

## 2024-09-10 ENCOUNTER — APPOINTMENT (OUTPATIENT)
Dept: RADIOLOGY | Facility: CLINIC | Age: 24
End: 2024-09-10
Payer: COMMERCIAL

## 2024-09-10 ENCOUNTER — HOSPITAL ENCOUNTER (OUTPATIENT)
Dept: RADIOLOGY | Facility: HOSPITAL | Age: 24
Discharge: HOME | End: 2024-09-10
Payer: COMMERCIAL

## 2024-09-10 DIAGNOSIS — E28.2 PCOS (POLYCYSTIC OVARIAN SYNDROME): ICD-10-CM

## 2024-09-10 DIAGNOSIS — R14.0 MENSTRUAL BLOATING: ICD-10-CM

## 2024-09-10 DIAGNOSIS — N94.89 MENSTRUAL BLOATING: ICD-10-CM

## 2024-09-10 PROCEDURE — 76830 TRANSVAGINAL US NON-OB: CPT

## 2024-09-11 LAB — 17OHP SERPL-MCNC: 44.65 NG/DL

## 2024-09-12 LAB
TESTOSTERONE FREE (CHAN): 6.2 PG/ML (ref 0.1–6.4)
TESTOSTERONE,TOTAL,LC-MS/MS: 39 NG/DL (ref 2–45)

## 2024-09-24 ENCOUNTER — OFFICE VISIT (OUTPATIENT)
Dept: OBSTETRICS AND GYNECOLOGY | Facility: CLINIC | Age: 24
End: 2024-09-24
Payer: COMMERCIAL

## 2024-09-24 VITALS
BODY MASS INDEX: 26.22 KG/M2 | DIASTOLIC BLOOD PRESSURE: 72 MMHG | HEIGHT: 69 IN | SYSTOLIC BLOOD PRESSURE: 125 MMHG | WEIGHT: 177 LBS

## 2024-09-24 DIAGNOSIS — E28.2 PCOS (POLYCYSTIC OVARIAN SYNDROME): Primary | ICD-10-CM

## 2024-09-24 PROCEDURE — 99213 OFFICE O/P EST LOW 20 MIN: CPT | Performed by: OBSTETRICS & GYNECOLOGY

## 2024-09-24 PROCEDURE — 1036F TOBACCO NON-USER: CPT | Performed by: OBSTETRICS & GYNECOLOGY

## 2024-09-24 PROCEDURE — 3008F BODY MASS INDEX DOCD: CPT | Performed by: OBSTETRICS & GYNECOLOGY

## 2024-09-24 ASSESSMENT — ENCOUNTER SYMPTOMS
LOSS OF SENSATION IN FEET: 0
OCCASIONAL FEELINGS OF UNSTEADINESS: 0
DEPRESSION: 0

## 2024-09-24 ASSESSMENT — PATIENT HEALTH QUESTIONNAIRE - PHQ9
2. FEELING DOWN, DEPRESSED OR HOPELESS: NOT AT ALL
1. LITTLE INTEREST OR PLEASURE IN DOING THINGS: NOT AT ALL
SUM OF ALL RESPONSES TO PHQ9 QUESTIONS 1 & 2: 0

## 2024-09-24 ASSESSMENT — LIFESTYLE VARIABLES
HOW OFTEN DO YOU HAVE SIX OR MORE DRINKS ON ONE OCCASION: MONTHLY
HOW MANY STANDARD DRINKS CONTAINING ALCOHOL DO YOU HAVE ON A TYPICAL DAY: 1 OR 2
SKIP TO QUESTIONS 9-10: 0
HOW OFTEN DO YOU HAVE A DRINK CONTAINING ALCOHOL: MONTHLY OR LESS
AUDIT-C TOTAL SCORE: 3

## 2024-09-24 ASSESSMENT — SOCIAL DETERMINANTS OF HEALTH (SDOH)
WITHIN THE LAST YEAR, HAVE YOU BEEN HUMILIATED OR EMOTIONALLY ABUSED IN OTHER WAYS BY YOUR PARTNER OR EX-PARTNER?: NO
WITHIN THE LAST YEAR, HAVE YOU BEEN AFRAID OF YOUR PARTNER OR EX-PARTNER?: NO
WITHIN THE LAST YEAR, HAVE TO BEEN RAPED OR FORCED TO HAVE ANY KIND OF SEXUAL ACTIVITY BY YOUR PARTNER OR EX-PARTNER?: NO
WITHIN THE LAST YEAR, HAVE YOU BEEN KICKED, HIT, SLAPPED, OR OTHERWISE PHYSICALLY HURT BY YOUR PARTNER OR EX-PARTNER?: NO

## 2024-09-24 ASSESSMENT — PAIN SCALES - GENERAL: PAINLEVEL: 0-NO PAIN

## 2024-09-24 NOTE — PROGRESS NOTES
Subjective   Patient ID: Kenny Franklin is a 24 y.o. female who presents for menses (Irregular ).  25yo G0 w/h/o PCOS presents for repeat labs as she's noticed some irregular periods lately. Pt also thinking of becoming pregnant soon so I started tracking her ovulation, most recent cycle she did ovulate but was on day 34.  Patient on day 40 of her cycle and on still has not had a period.  Patient did start on medication for anxiety and has noted increasing weight gain was not sure if it was because of her PCOS or the medication.  Patient states her symptoms are very well-controlled on the medication so she wishes to stay on.        Review of Systems   Genitourinary:  Positive for menstrual problem. Negative for dyspareunia, pelvic pain, vaginal discharge and vaginal pain.       Objective   Physical Exam  Constitutional:       Appearance: Normal appearance.   HENT:      Head: Normocephalic and atraumatic.   Skin:     General: Skin is warm and dry.   Neurological:      General: No focal deficit present.      Mental Status: She is alert.   Psychiatric:         Attention and Perception: Attention and perception normal.         Mood and Affect: Mood and affect normal.         Speech: Speech normal.         Behavior: Behavior is cooperative.       Assessment/Plan   Problem List Items Addressed This Visit    None  Visit Diagnoses         Codes    PCOS (polycystic ovarian syndrome)    -  Primary E28.2          Discussed with patient recheck of PCOS labs look within normal limits, fasting insulin and sugar levels normal.  Discussed with patient increasing protein and optimizing fiber in her diet as well as adding some weightlifting into her regimen as she is only walking now.  Patient also encouraged to potentially discuss with her psychiatrist other options for treatment as may be contributing to her weight gain.  Patient encouraged to start a prenatal vitamin and to continue tracking her cycles for ovulation.  Patient also  notified to let us know when she does become pregnant so we can check a pregnancy hormone level and an early ultrasound for her as she has irregular cycles so will need 1 for dating.       Amaya Chu MD 09/24/24 1:37 PM

## 2024-12-04 ENCOUNTER — LAB (OUTPATIENT)
Dept: LAB | Facility: LAB | Age: 24
End: 2024-12-04
Payer: COMMERCIAL

## 2024-12-04 DIAGNOSIS — N92.6 IRREGULAR MENSTRUAL CYCLE: ICD-10-CM

## 2024-12-04 DIAGNOSIS — R53.83 OTHER FATIGUE: ICD-10-CM

## 2024-12-04 LAB
ERYTHROCYTE [DISTWIDTH] IN BLOOD BY AUTOMATED COUNT: 12.6 % (ref 11.5–14.5)
FERRITIN SERPL-MCNC: 22 NG/ML (ref 8–150)
HCT VFR BLD AUTO: 40.3 % (ref 36–46)
HGB BLD-MCNC: 13.3 G/DL (ref 12–16)
IRON SATN MFR SERPL: 24 % (ref 25–45)
IRON SERPL-MCNC: 102 UG/DL (ref 35–150)
MCH RBC QN AUTO: 29.4 PG (ref 26–34)
MCHC RBC AUTO-ENTMCNC: 33 G/DL (ref 32–36)
MCV RBC AUTO: 89 FL (ref 80–100)
NRBC BLD-RTO: 0 /100 WBCS (ref 0–0)
PLATELET # BLD AUTO: 343 X10*3/UL (ref 150–450)
RBC # BLD AUTO: 4.52 X10*6/UL (ref 4–5.2)
TIBC SERPL-MCNC: 425 UG/DL (ref 240–445)
UIBC SERPL-MCNC: 323 UG/DL (ref 110–370)
WBC # BLD AUTO: 5.2 X10*3/UL (ref 4.4–11.3)

## 2024-12-04 PROCEDURE — 82728 ASSAY OF FERRITIN: CPT

## 2024-12-04 PROCEDURE — 85027 COMPLETE CBC AUTOMATED: CPT

## 2024-12-04 PROCEDURE — 83540 ASSAY OF IRON: CPT

## 2024-12-04 PROCEDURE — 83550 IRON BINDING TEST: CPT

## 2024-12-12 ENCOUNTER — APPOINTMENT (OUTPATIENT)
Dept: OBSTETRICS AND GYNECOLOGY | Facility: CLINIC | Age: 24
End: 2024-12-12
Payer: COMMERCIAL

## 2025-01-29 ENCOUNTER — OFFICE VISIT (OUTPATIENT)
Dept: OBSTETRICS AND GYNECOLOGY | Facility: CLINIC | Age: 25
End: 2025-01-29
Payer: COMMERCIAL

## 2025-01-29 VITALS
WEIGHT: 181 LBS | HEART RATE: 84 BPM | BODY MASS INDEX: 26.81 KG/M2 | HEIGHT: 69 IN | SYSTOLIC BLOOD PRESSURE: 96 MMHG | DIASTOLIC BLOOD PRESSURE: 61 MMHG | OXYGEN SATURATION: 97 %

## 2025-01-29 DIAGNOSIS — Z01.419 ENCOUNTER FOR ANNUAL ROUTINE GYNECOLOGICAL EXAMINATION: Primary | ICD-10-CM

## 2025-01-29 PROCEDURE — 99395 PREV VISIT EST AGE 18-39: CPT | Performed by: OBSTETRICS & GYNECOLOGY

## 2025-01-29 PROCEDURE — 1036F TOBACCO NON-USER: CPT | Performed by: OBSTETRICS & GYNECOLOGY

## 2025-01-29 PROCEDURE — 3008F BODY MASS INDEX DOCD: CPT | Performed by: OBSTETRICS & GYNECOLOGY

## 2025-01-29 ASSESSMENT — ENCOUNTER SYMPTOMS
LOSS OF SENSATION IN FEET: 0
DEPRESSION: 0
OCCASIONAL FEELINGS OF UNSTEADINESS: 0

## 2025-01-29 ASSESSMENT — LIFESTYLE VARIABLES
SKIP TO QUESTIONS 9-10: 0
HOW OFTEN DO YOU HAVE A DRINK CONTAINING ALCOHOL: MONTHLY OR LESS
HOW MANY STANDARD DRINKS CONTAINING ALCOHOL DO YOU HAVE ON A TYPICAL DAY: 1 OR 2
HOW OFTEN DO YOU HAVE SIX OR MORE DRINKS ON ONE OCCASION: LESS THAN MONTHLY
AUDIT-C TOTAL SCORE: 2

## 2025-01-29 ASSESSMENT — PATIENT HEALTH QUESTIONNAIRE - PHQ9
2. FEELING DOWN, DEPRESSED OR HOPELESS: NOT AT ALL
SUM OF ALL RESPONSES TO PHQ9 QUESTIONS 1 & 2: 0
1. LITTLE INTEREST OR PLEASURE IN DOING THINGS: NOT AT ALL

## 2025-01-29 ASSESSMENT — PAIN SCALES - GENERAL: PAINLEVEL_OUTOF10: 0-NO PAIN

## 2025-01-29 NOTE — PROGRESS NOTES
ANNUAL GYNECOLOGIC VISIT    Subjective   HPI:  Kenny Franklin is a 24 y.o. female  Patient's last menstrual period was 2024 (exact date). for annual exam.    Complaints:   considering pregnancy soon, tracking cycles; will start pnv soon  Periods: regular   Dysmenorrhea:  none    GYNH:   Menarche  age  12  HPV Vaccine  yes  Sexual activity yes  Current contraception   condoms  History of abnormal pap   none    Last pap smear:   Result Date Procedure Results Follow-ups Next Due   2021 CONVERTED GYN CYTOLOGY CONVERTED FINAL DIAGNOSIS:   SPECIMEN ADEQUACY:       Satisfactory for evaluation.           Endocervical transformation zone component present.         GENERAL CATEGORIZATION:       Negative for intraepithelial lesion or malignancy.            See interpretation-result.          ...  CONVERTED COMMENT: The Pap test is only a screening test for cervical cancer. As with all  screening tests, false-negative results can occur, emphasizing the need  for ongoing surveillance and clinical correlation. If there are any  questions about the results of this scre...  CONVERTED CLINICAL CYTOLOGY HISTORY: CLINICAL HISTORY:  Date of Last Menstrual Period: 21  Reflex High Risk HPV Testing If ASCUS - IF ABNORMAL           OB History          0    Para   0    Term   0       0    AB   0    Living             SAB   0    IAB   0    Ectopic   0    Multiple        Live Births                        Past Medical History:   Diagnosis Date    Anxiety 2018    Headache 2023    PCOS (polycystic ovarian syndrome)     Polycystic ovary syndrome 22       Past Surgical History:   Procedure Laterality Date    OVARY SURGERY Right     RT ovary torsion       Prior to Admission medications    Medication Sig Start Date End Date Taking? Authorizing Provider   escitalopram (Lexapro) 5 mg  "tablet 1 tablet (5 mg). 2/8/24  Yes Historical Provider, MD   hydrOXYzine HCL (Atarax) 25 mg tablet  3/22/24  Yes Historical Provider, MD       Social History     Tobacco Use    Smoking status: Never    Smokeless tobacco: Never   Vaping Use    Vaping status: Never Used   Substance Use Topics    Alcohol use: Yes     Comment: 3 every other week    Drug use: Never        Objective   BP 96/61   Pulse 84   Ht 1.753 m (5' 9\")   Wt 82.1 kg (181 lb)   LMP 12/30/2024 (Exact Date)   SpO2 97%   BMI 26.73 kg/m²        General:   Alert and oriented, in no acute distress   Neck: Supple. No visible thyromegaly.    Breast/Axilla: Normal to palpation bilaterally without masses, skin changes, or nipple discharge.    Abdomen: Soft, non-tender, without masses or organomegaly   Vulva: Normal architecture without erythema, masses, or lesions.    Vagina: Normal mucosa without lesions, masses, or atrophy. No abnormal vaginal discharge.    Cervix: Normal without masses, lesions, or signs of cervicitis   Uterus: Normal, mobile, non-enlarged uterus   Adnexa: No palpable masses or tendernes   Pelvic Floor normal   Psych Normal affect. Normal mood.      Assessment/Plan   Problem List Items Addressed This Visit    None  Visit Diagnoses         Codes    Encounter for annual routine gynecological examination    -  Primary Z01.419    Relevant Orders    THINPREP PAP          Routine annual  Pap due today    STI screening as indicated  Birth control counseling: Barrier methods of contraception for prevention of STIs.   Preventative hygiene habits.  Health promoting habits: Exercise: 30-60 minutes 3 to 5 days/week. Diet: Healthy diet and drink plenty of water each day.  Return in 1 year for routine GYN exam or sooner as clinically indicated.    Amaya Chu MD       "

## 2025-02-10 LAB
CYTOLOGY CMNT CVX/VAG CYTO-IMP: NORMAL
LAB AP HPV GENOTYPE QUESTION: YES
LAB AP HPV HR: NORMAL
LABORATORY COMMENT REPORT: NORMAL
LMP START DATE: NORMAL
PATH REPORT.TOTAL CANCER: NORMAL

## 2025-02-10 ASSESSMENT — ENCOUNTER SYMPTOMS
COUGH: 1
SORE THROAT: 0
CHILLS: 0
HEADACHES: 0
HEARTBURN: 0
SHORTNESS OF BREATH: 0
WHEEZING: 0
SWEATS: 0
HEMOPTYSIS: 0
FEVER: 0
WEIGHT LOSS: 0
RHINORRHEA: 0
MYALGIAS: 0

## 2025-02-11 ENCOUNTER — OFFICE VISIT (OUTPATIENT)
Dept: PRIMARY CARE | Facility: CLINIC | Age: 25
End: 2025-02-11
Payer: COMMERCIAL

## 2025-02-11 VITALS
HEIGHT: 69 IN | HEART RATE: 73 BPM | BODY MASS INDEX: 26.81 KG/M2 | WEIGHT: 181 LBS | SYSTOLIC BLOOD PRESSURE: 126 MMHG | DIASTOLIC BLOOD PRESSURE: 78 MMHG | OXYGEN SATURATION: 97 %

## 2025-02-11 DIAGNOSIS — Z91.09 ENVIRONMENTAL ALLERGIES: ICD-10-CM

## 2025-02-11 DIAGNOSIS — R05.3 PERSISTENT DRY COUGH: ICD-10-CM

## 2025-02-11 DIAGNOSIS — K29.60 REFLUX GASTRITIS: Primary | ICD-10-CM

## 2025-02-11 PROCEDURE — 3008F BODY MASS INDEX DOCD: CPT | Performed by: NURSE PRACTITIONER

## 2025-02-11 PROCEDURE — 99213 OFFICE O/P EST LOW 20 MIN: CPT | Performed by: NURSE PRACTITIONER

## 2025-02-11 RX ORDER — OMEPRAZOLE 20 MG/1
20 CAPSULE, DELAYED RELEASE ORAL
Qty: 30 CAPSULE | Refills: 1 | Status: SHIPPED | OUTPATIENT
Start: 2025-02-11 | End: 2025-08-10

## 2025-02-11 RX ORDER — LORATADINE 10 MG/1
10 TABLET ORAL DAILY
Qty: 30 TABLET | Refills: 1 | Status: SHIPPED | OUTPATIENT
Start: 2025-02-11 | End: 2026-02-11

## 2025-02-11 RX ORDER — BUPROPION HYDROCHLORIDE 150 MG/1
150 TABLET ORAL
COMMUNITY
End: 2025-02-11 | Stop reason: WASHOUT

## 2025-02-11 RX ORDER — NORETHINDRONE ACETATE AND ETHINYL ESTRADIOL 1MG-20(21)
KIT ORAL
COMMUNITY
Start: 2023-10-04 | End: 2025-02-11 | Stop reason: WASHOUT

## 2025-02-11 ASSESSMENT — ENCOUNTER SYMPTOMS
SWEATS: 0
HEARTBURN: 0
HEMATOLOGIC/LYMPHATIC NEGATIVE: 1
HEADACHES: 0
CHILLS: 0
SORE THROAT: 0
WHEEZING: 0
COUGH: 1
HEMOPTYSIS: 0
WEIGHT LOSS: 0
FEVER: 0
SHORTNESS OF BREATH: 0
MYALGIAS: 0
RHINORRHEA: 0

## 2025-02-11 NOTE — PATIENT INSTRUCTIONS
Starting on Loratadine for allergies and Omeprazole for reflux use consistent use for 6-8 weeks, discontinue Omeprazole first and see if cough returns, if not then discontinue Loratadine 2 weeks later and see if cough returns.

## 2025-02-11 NOTE — PROGRESS NOTES
Subjective   Patient ID: Kenny Franklin is a 24 y.o. female who presents for OTHER (Persistent cough).    Cough  This is a recurrent problem. The current episode started more than 1 month ago. The problem has been unchanged. The problem occurs every few hours. The cough is Non-productive. Associated symptoms include postnasal drip. Pertinent negatives include no chest pain, chills, ear congestion, ear pain, fever, headaches, heartburn, hemoptysis, myalgias, nasal congestion, rash, rhinorrhea, sore throat, shortness of breath, sweats, weight loss or wheezing. The symptoms are aggravated by lying down. Risk factors for lung disease include animal exposure. Her past medical history is significant for environmental allergies (as noted in HPI).      Patient here for ongoing concern- persistent cough. Last office visit on 12/21/2023  Current concern:  1) persistent cough.  For 3 months, worse at home with laying down. She had recorded self breathing at night while laying down.  During the day not as much of this ongoing audible noise (sounds light expiratory wheezing) on audio recording.  Does have new dog (since last June) a Doddle- suppose to be hypoallergenic. Reports does taste blood- metallic taste at times, does not see any blood or darker sputum. Currently attempting to conceive.   While exercising no concerns. Randomly itchy nose and eyes. Watering more at home. Using Zyrtec at times.   Chronic concerns: Palpitations, depression/anxiety, PCOS,  Ovarian torsion, vertigo  Specialist  - GYN Dr Chu  - Neurology Dr De Jesus  -Endocrinology Dr Tirado  - cardiology Dr Villafana   Labs 12/04/2024 CBC, ferritin- stable     NON SMOKER    Review of Systems   Constitutional:  Negative for chills, fever and weight loss.   HENT:  Positive for postnasal drip. Negative for ear pain, rhinorrhea and sore throat.    Respiratory:  Positive for cough. Negative for hemoptysis, shortness of breath and wheezing.    Cardiovascular:   "Negative for chest pain.   Gastrointestinal:  Negative for heartburn.        Intermittent heartburn- using TUMs several times a week.    Musculoskeletal:  Negative for myalgias.   Skin:  Negative for rash.   Allergic/Immunologic: Positive for environmental allergies (as noted in HPI).   Neurological:  Negative for headaches.   Hematological: Negative.        Objective   /78 (BP Location: Right arm, Patient Position: Sitting)   Pulse 73   Ht 1.753 m (5' 9\")   Wt 82.1 kg (181 lb)   LMP 12/30/2024 (Exact Date)   SpO2 97%   BMI 26.73 kg/m²   Weight  in December 2023 151 lbs    Physical Exam  Vitals reviewed.   Constitutional:       Appearance: She is normal weight.   HENT:      Right Ear: There is no impacted cerumen (significant moist cerumen, 75% occluding).      Left Ear: There is no impacted cerumen (significant moist cerumen, 75% occluding).      Nose: Nose normal.      Mouth/Throat:      Lips: Pink.      Mouth: Mucous membranes are moist.      Pharynx: Oropharynx is clear.   Eyes:      General: Lids are normal.      Conjunctiva/sclera: Conjunctivae normal.   Cardiovascular:      Rate and Rhythm: Normal rate and regular rhythm.      Heart sounds: Normal heart sounds.   Pulmonary:      Effort: Pulmonary effort is normal.      Breath sounds: Normal breath sounds. No decreased breath sounds, wheezing, rhonchi or rales.   Musculoskeletal:      Cervical back: Neck supple.   Lymphadenopathy:      Cervical: No cervical adenopathy.   Skin:     General: Skin is warm.      Findings: No rash.   Neurological:      General: No focal deficit present.      Mental Status: She is alert.   Psychiatric:         Attention and Perception: Attention normal.         Behavior: Behavior normal. Behavior is cooperative.       Assessment/Plan   Diagnoses and all orders for this visit:  Reflux gastritis  / Persistent dry cough  Starting on Loratadine for allergies and Omeprazole for reflux use consistent use for 6-8 weeks, " discontinue Omeprazole first and see if cough returns, if not then discontinue Loratadine 2 weeks later and see if cough returns.   Keep me informed with any returning symptoms.   -Initiated  omeprazole (PriLOSEC) 20 mg DR capsule; Take 1 capsule (20 mg) by mouth once daily in the morning. Take before meals. Do not crush or chew.  Environmental allergies  - initiated loratadine (Claritin) 10 mg tablet; Take 1 tablet (10 mg) by mouth once daily.     PLAN: Follow up yearly as wellness

## 2025-02-20 ENCOUNTER — HOSPITAL ENCOUNTER (OUTPATIENT)
Dept: RADIOLOGY | Facility: CLINIC | Age: 25
Discharge: HOME | End: 2025-02-20
Payer: COMMERCIAL

## 2025-02-20 DIAGNOSIS — R05.3 PERSISTENT DRY COUGH: ICD-10-CM

## 2025-02-20 DIAGNOSIS — R05.3 PERSISTENT DRY COUGH: Primary | ICD-10-CM

## 2025-02-20 PROCEDURE — 71046 X-RAY EXAM CHEST 2 VIEWS: CPT

## 2025-06-12 ENCOUNTER — PATIENT MESSAGE (OUTPATIENT)
Dept: OBSTETRICS AND GYNECOLOGY | Facility: CLINIC | Age: 25
End: 2025-06-12
Payer: COMMERCIAL

## 2025-06-12 DIAGNOSIS — Z32.01 ENCOUNTER FOR PREGNANCY TEST, RESULT POSITIVE (HHS-HCC): Primary | ICD-10-CM

## 2025-06-12 NOTE — PATIENT COMMUNICATION
When was pts LMP? Please wait until missed period to do blood work. Can do hcg quant and progesterone level bc pt desires; supplementing progesterone prior to knowing if viable pregnancy not typically done but can follow hcg values and once they are >1500 can do early US. Need to verify viable pregnancy so not supporting ectopic or sab. Typically dont give supplemental progesterone in a normal first pregnancy either        Hcg quant ordered

## 2025-06-13 LAB
B-HCG SERPL-ACNC: 28 MIU/ML
PROGEST SERPL-MCNC: 32.7 NG/ML

## 2025-06-17 DIAGNOSIS — Z32.01 ENCOUNTER FOR PREGNANCY TEST, RESULT POSITIVE (HHS-HCC): ICD-10-CM

## 2025-06-17 LAB — B-HCG SERPL-ACNC: 10 MIU/ML

## 2025-06-18 DIAGNOSIS — Z32.01 ENCOUNTER FOR PREGNANCY TEST, RESULT POSITIVE (HHS-HCC): ICD-10-CM

## 2025-06-18 LAB — B-HCG SERPL-ACNC: <5 MIU/ML

## 2025-06-19 DIAGNOSIS — Z32.01 ENCOUNTER FOR PREGNANCY TEST, RESULT POSITIVE (HHS-HCC): ICD-10-CM

## 2025-06-23 DIAGNOSIS — Z32.01 ENCOUNTER FOR PREGNANCY TEST, RESULT POSITIVE (HHS-HCC): ICD-10-CM

## 2025-07-01 ENCOUNTER — TELEMEDICINE (OUTPATIENT)
Dept: OBSTETRICS AND GYNECOLOGY | Facility: CLINIC | Age: 25
End: 2025-07-01
Payer: COMMERCIAL

## 2025-07-01 DIAGNOSIS — O03.9 MISCARRIAGE (HHS-HCC): Primary | ICD-10-CM

## 2025-07-01 PROCEDURE — 1036F TOBACCO NON-USER: CPT | Performed by: OBSTETRICS & GYNECOLOGY

## 2025-07-01 PROCEDURE — 99213 OFFICE O/P EST LOW 20 MIN: CPT | Performed by: OBSTETRICS & GYNECOLOGY

## 2025-07-01 NOTE — PROGRESS NOTES
"Consent:  Verbal consent was requested and obtained from patient on this date for a telehealth visit to determine if a office visit would be necessary for the patient's complaint(s).  Audio was performed due to the unavailability of video technology for the patient to participate face-to-face.    Referring Physician: No referring provider defined for this encounter.  Primary Care Provider: Jenifer Edomnds, APRN-CNP     Subjective    HPI patient presents to discuss her most recent SAB. Pt was noted to have vb shortly after missing period w/increased cramping/clots. Sx have resolved. Hcg quant was in normal range at last check. Pt was worried bc she was working with a [facility] doc that was following her hormone levels w/this pregnancy and she was noted to have \"low hormones\". Progesterone level this pregnancy was 32.7    Medical History[1]     Surgical History[2]     Social History[3]     Current Outpatient Medications   Medication Instructions    escitalopram (LEXAPRO) 5 mg    hydrOXYzine HCL (Atarax) 25 mg tablet     loratadine (CLARITIN) 10 mg, oral, Daily    omeprazole (PRILOSEC) 20 mg, oral, Daily before breakfast, Do not crush or chew.      Objective    BSA: There is no height or weight on file to calculate BSA.  There were no vitals taken for this visit.     Physical Exam  General: AAOx3 in NAD   Psych: mood and affect are appropiate. Able to consent.     Assessment/Plan         Problem List Items Addressed This Visit    None  Visit Diagnoses         Codes      Miscarriage ([facility])    -  Primary O03.9        Discussed with patient, etiologies of miscarriages in the first trimester.  Discussed with patient typically do not support pregnancies with progesterone unless evidence of luteal defect or late first trimester loss.  Discussed with patient using progesterone without confirming intrauterine status and following hCG quant with her next pregnancy.  Discussed with patient could wait up to 3 normal cycles " prior to trying again however there is no strict rules about when to try again.     Treatment Plans         All new and/or current medications discussed and reviewed including side effects with patient/caregiver, Understanding:  Caregiver/Patient expressed understanding., Adherence:  Barriers to adherence identified and discussed if present.                    [1]   Past Medical History:  Diagnosis Date    Anxiety 2018    Headache 05/08/2023    PCOS (polycystic ovarian syndrome)     Polycystic ovary syndrome 12/14/22   [2]   Past Surgical History:  Procedure Laterality Date    OVARY SURGERY Right     RT ovary torsion   [3]   Social History  Tobacco Use    Smoking status: Never    Smokeless tobacco: Never   Vaping Use    Vaping status: Never Used   Substance Use Topics    Alcohol use: Not Currently     Comment: 3 every other week    Drug use: Never

## 2025-09-02 ENCOUNTER — TELEPHONE (OUTPATIENT)
Dept: OBSTETRICS AND GYNECOLOGY | Facility: CLINIC | Age: 25
End: 2025-09-02
Payer: COMMERCIAL

## 2025-09-02 DIAGNOSIS — Z87.59 HISTORY OF MISCARRIAGE: ICD-10-CM

## 2025-09-02 DIAGNOSIS — N92.6 MISSED PERIOD: Primary | ICD-10-CM

## 2026-02-12 ENCOUNTER — APPOINTMENT (OUTPATIENT)
Dept: PRIMARY CARE | Facility: CLINIC | Age: 26
End: 2026-02-12
Payer: COMMERCIAL